# Patient Record
Sex: MALE | Race: WHITE | NOT HISPANIC OR LATINO | Employment: OTHER | URBAN - METROPOLITAN AREA
[De-identification: names, ages, dates, MRNs, and addresses within clinical notes are randomized per-mention and may not be internally consistent; named-entity substitution may affect disease eponyms.]

---

## 2018-06-01 ENCOUNTER — OFFICE VISIT (OUTPATIENT)
Dept: UROLOGY | Facility: CLINIC | Age: 63
End: 2018-06-01
Payer: COMMERCIAL

## 2018-06-01 VITALS
BODY MASS INDEX: 27.02 KG/M2 | HEART RATE: 68 BPM | HEIGHT: 67 IN | WEIGHT: 172.13 LBS | DIASTOLIC BLOOD PRESSURE: 83 MMHG | SYSTOLIC BLOOD PRESSURE: 147 MMHG

## 2018-06-01 DIAGNOSIS — N50.819 TESTICLE PAIN: Primary | ICD-10-CM

## 2018-06-01 DIAGNOSIS — N45.1 ACUTE EPIDIDYMITIS: ICD-10-CM

## 2018-06-01 DIAGNOSIS — N52.9 ERECTILE DYSFUNCTION, UNSPECIFIED ERECTILE DYSFUNCTION TYPE: ICD-10-CM

## 2018-06-01 LAB
BILIRUB SERPL-MCNC: NORMAL MG/DL
BLOOD URINE, POC: NORMAL
COLOR, POC UA: YELLOW
GLUCOSE UR QL STRIP: NORMAL
KETONES UR QL STRIP: NORMAL
LEUKOCYTE ESTERASE URINE, POC: NORMAL
NITRITE, POC UA: NORMAL
PH, POC UA: 6
PROTEIN, POC: NORMAL
SPECIFIC GRAVITY, POC UA: 1.02
UROBILINOGEN, POC UA: NORMAL

## 2018-06-01 PROCEDURE — 3079F DIAST BP 80-89 MM HG: CPT | Mod: CPTII,S$GLB,, | Performed by: UROLOGY

## 2018-06-01 PROCEDURE — 81002 URINALYSIS NONAUTO W/O SCOPE: CPT | Mod: S$GLB,,, | Performed by: UROLOGY

## 2018-06-01 PROCEDURE — 99999 PR PBB SHADOW E&M-EST. PATIENT-LVL III: CPT | Mod: PBBFAC,,, | Performed by: UROLOGY

## 2018-06-01 PROCEDURE — 3077F SYST BP >= 140 MM HG: CPT | Mod: CPTII,S$GLB,, | Performed by: UROLOGY

## 2018-06-01 PROCEDURE — 3008F BODY MASS INDEX DOCD: CPT | Mod: CPTII,S$GLB,, | Performed by: UROLOGY

## 2018-06-01 PROCEDURE — 99214 OFFICE O/P EST MOD 30 MIN: CPT | Mod: 25,S$GLB,, | Performed by: UROLOGY

## 2018-06-01 RX ORDER — SILDENAFIL CITRATE 20 MG/1
20 TABLET ORAL 3 TIMES DAILY
Qty: 50 TABLET | Refills: 11 | Status: SHIPPED | OUTPATIENT
Start: 2018-06-01 | End: 2019-07-31

## 2018-06-01 RX ORDER — DOXYCYCLINE 100 MG/1
100 CAPSULE ORAL 2 TIMES DAILY
Qty: 20 CAPSULE | Refills: 0 | Status: SHIPPED | OUTPATIENT
Start: 2018-06-01 | End: 2018-06-11

## 2018-06-01 NOTE — PROGRESS NOTES
UROLOGY [place]  6 1 18    c-c R testicular pain    Age 63, comes in that he has been having a discomfort on the R testicle, especially during driving, or while being in bed and turning if he would inadvertently compress the genital area between thighs. The area was not apparently swollen, only tender. The L side not giving him trouble.     Voiding generally well, with diminished flow. Nocturia x 2-3. No burning or pain during voiding.       PMH     Surgical: Back surgery; Penis surgery; Esophageal dilation; Circumcision, primary; Joint replacement; and Spine surgery.     Medical: Kidney stone; Allergy; Colon polyp; Hypertension; Osteoarthritis; Acid reflux; Umbilical hernia; DM (diabetes mellitus) (2012); Diabetes mellitus type II; and Hyperlipidemia.     Familial: no fh of prostate ca. Father and mother both  of lung cancer (both started smoking during childhood: he at age 8, she at age 13). Brother  of massive heart attack at age 34.     Social: , lives in [place], works for [employer]            Current Outpatient Prescriptions on File Prior to Visit   Medication Sig Dispense Refill    aspirin 81 MG Chew Take 81 mg by mouth         ezetimibe (ZETIA) 10 mg tablet Take 1 tablet  90 tablet 3    fish oil-omega-3 fatty acids 300-1,000 mg capsule Take 2 g by mouth once daily.        glimepiride (AMARYL) 2 MG tablet Take 1 tablet (2 mg 90 tablet 3    ibuprofen (ADVIL,MOTRIN) 800 MG tablet Take 1 tablet (800 mg total) b 90 tablet 6    metformin (GLUCOPHAGE) 500 MG tablet Take 2 tablets (1,000 mg total) by mo 360 tablet 3    omeprazole (PRILOSEC) 40 MG Take 1 capsule (40 m 90 capsule 3    ONETOUCH DELICA JUDD TEST TWICE DAILY 100 each 3    ONETOUCH ULTRA TEST Strp TEST TWICE DAILY 250 strip 0    saw-pygeum-nettle-pumpkn-aa#17 (PROSTAT Take 1 tablet by mouth once daily.         testosterone (AXIRON) 30 mg/actuation (1.5 mL) SlPm Apply 30 mg topically every other day. 90 mL 3     losartan-hydrochlorothiazi Take 1 tablet by mout 90 tablet 3      ROS:  Denies malaise, headaches, eye symptoms, difficulty swallowing or breathing problems.    No chest pains or palpitations.    No change in bowel habits, no tarry stools or hematochezia. Has acid reflux.  No genital lesions.  No bleeding disorders, no seizures.  Psych normal affect, normal mood     PE:  Pt alert, oriented, no distress  HEENT: wnl.  Neck: supple, no JVD, no lymphadenopathy  Chest: CV NSR  Lungs: normal chest expansion  Abdomen flat, nontender, no organomegaly, no masses.  No hernias  Penis circumcised, meatus nl  Testes nl, epi nl, scrotum nl  DONNIE: anus nl, sphincter nl tone, mucosa without lesions, prostate 30 gm, symmetric, no nodules or indurations  Extremities: no edema, peripheral pulses nl  Neuro: preserved      psa done one year ago was 0.56     IMPRESSION:     Acute epididymitis, will use doxycycline 100 bid x 2 weeks  ]Benign prostatic hypertrophy, on nutritional supplement     Erectile dysfct, on pde5 inhibitor (viagra 100 disp 8 per mo)

## 2018-06-14 ENCOUNTER — TELEPHONE (OUTPATIENT)
Dept: UROLOGY | Facility: CLINIC | Age: 63
End: 2018-06-14

## 2018-06-14 DIAGNOSIS — N45.1 EPIDIDYMITIS: Primary | ICD-10-CM

## 2018-06-14 RX ORDER — SULFAMETHOXAZOLE AND TRIMETHOPRIM 800; 160 MG/1; MG/1
1 TABLET ORAL 2 TIMES DAILY
Qty: 28 TABLET | Refills: 0 | Status: SHIPPED | OUTPATIENT
Start: 2018-06-14 | End: 2018-06-28

## 2018-06-14 NOTE — TELEPHONE ENCOUNTER
PLEASE LET PT KNOW THAT I CALLED HIS PHARMACY ([facility]) AND PRESCRIBED TWO MORE WEEKS OF AN ANTIBIOTIC ( A DIFFERENT ONE THIS TIME, BACTRIM), HOPING IT WILL HELP HIS TESTICLES. IF CONTINUES WITH PROBLEMS WILL NEED TO COME AND SEE ME AGAIN AND I MIGHT BE ORDERING IMAGING, ETC.

## 2018-06-14 NOTE — TELEPHONE ENCOUNTER
Spoke to pt and he is still having pain in his testicles. Pt takes OTC supplement for his prostate. Pt states he took the 2 weeks of doxycycline with no improvement. Please advise.

## 2018-06-14 NOTE — TELEPHONE ENCOUNTER
----- Message from Ras Pickett sent at 6/14/2018 10:53 AM CDT -----  Contact: Patient  Hima, [phone]. Calling in regards to his testicular pain. Says he finished the antibiotics but is still in pain. Please advise. Thanks.

## 2018-06-15 NOTE — TELEPHONE ENCOUNTER
Spoke to pt and informed him of the information below. Pt verbalized understanding. Booked pt for an apt in 2 weeks just in case, if pt does not need it, he will cancel.

## 2018-06-20 ENCOUNTER — TELEPHONE (OUTPATIENT)
Dept: ENDOCRINOLOGY | Facility: CLINIC | Age: 63
End: 2018-06-20

## 2018-06-20 NOTE — TELEPHONE ENCOUNTER
----- Message from Mariana Martinez sent at 6/20/2018  8:17 AM CDT -----  Contact: self  Patient call stating he received a letter saying he could schedule an earlier appointment than the one he has on 8/15    epic does not have anything earlier than 8/15    Please call [phone] (home)

## 2018-06-22 ENCOUNTER — TELEPHONE (OUTPATIENT)
Dept: UROLOGY | Facility: CLINIC | Age: 63
End: 2018-06-22

## 2018-06-22 NOTE — TELEPHONE ENCOUNTER
----- Message from Leonard Ramirez sent at 6/22/2018 10:40 AM CDT -----  Contact: same  Patient called in and stated he received an email regarding his test (urine) results being posted but would like a call back to go over them as patient has opted out of the  [facility] system.    Patient had test done on 6/1/18 at the [address] [facility] [address] location    Patient call back number is [phone]

## 2018-07-11 ENCOUNTER — OFFICE VISIT (OUTPATIENT)
Dept: UROLOGY | Facility: CLINIC | Age: 63
End: 2018-07-11
Payer: COMMERCIAL

## 2018-07-11 VITALS
SYSTOLIC BLOOD PRESSURE: 139 MMHG | HEIGHT: 67 IN | HEART RATE: 69 BPM | DIASTOLIC BLOOD PRESSURE: 82 MMHG | WEIGHT: 177.25 LBS | BODY MASS INDEX: 27.82 KG/M2

## 2018-07-11 DIAGNOSIS — N45.1 EPIDIDYMITIS: Primary | ICD-10-CM

## 2018-07-11 LAB
BILIRUB SERPL-MCNC: NORMAL MG/DL
BLOOD URINE, POC: NORMAL
COLOR, POC UA: YELLOW
GLUCOSE UR QL STRIP: 500
KETONES UR QL STRIP: NORMAL
LEUKOCYTE ESTERASE URINE, POC: NORMAL
NITRITE, POC UA: NORMAL
PH, POC UA: 6
PROTEIN, POC: NORMAL
SPECIFIC GRAVITY, POC UA: 1.02
UROBILINOGEN, POC UA: 0.2

## 2018-07-11 PROCEDURE — 3008F BODY MASS INDEX DOCD: CPT | Mod: CPTII,S$GLB,, | Performed by: UROLOGY

## 2018-07-11 PROCEDURE — 99999 PR PBB SHADOW E&M-EST. PATIENT-LVL III: CPT | Mod: PBBFAC,,, | Performed by: UROLOGY

## 2018-07-11 PROCEDURE — 3079F DIAST BP 80-89 MM HG: CPT | Mod: CPTII,S$GLB,, | Performed by: UROLOGY

## 2018-07-11 PROCEDURE — 81002 URINALYSIS NONAUTO W/O SCOPE: CPT | Mod: S$GLB,,, | Performed by: UROLOGY

## 2018-07-11 PROCEDURE — 3075F SYST BP GE 130 - 139MM HG: CPT | Mod: CPTII,S$GLB,, | Performed by: UROLOGY

## 2018-07-11 PROCEDURE — 99213 OFFICE O/P EST LOW 20 MIN: CPT | Mod: 25,S$GLB,, | Performed by: UROLOGY

## 2018-07-11 RX ORDER — CIPROFLOXACIN 500 MG/1
TABLET ORAL
Refills: 1 | COMMUNITY
Start: 2018-06-04 | End: 2019-02-14

## 2018-07-11 NOTE — PROGRESS NOTES
UROLOGY [place]  7 11 18    c-c testicular pain    Age 63, was here 6 weeks ago for a pain, the pain occurs over the R testicle and not anywhere else.  Pt was here recently for this and received doxycycline for it.  On exam the area is improved and has minimal if any tenderness.  Pt agrees that he has improved.      psa done one year ago was 0.56     IMPRESSION:      Acute epididymitis, used doxycycline 100 bid x 2 weeks  Pt believes he would benefit with another two weeks of antibiotic.  There is no evidence that he has continued infection but for peace of mind I am putting him on bactim x 2 weeks.  Benign prostatic hypertrophy, on nutritional supplement     Erectile dysfct, on pde5 inhibitor (viagra 100 disp 8 per mo)  Hypogonadism, on testosterone replacement.

## 2018-08-15 ENCOUNTER — OFFICE VISIT (OUTPATIENT)
Dept: ENDOCRINOLOGY | Facility: CLINIC | Age: 63
End: 2018-08-15
Payer: COMMERCIAL

## 2018-08-15 VITALS
WEIGHT: 175.38 LBS | HEIGHT: 67 IN | SYSTOLIC BLOOD PRESSURE: 138 MMHG | HEART RATE: 84 BPM | RESPIRATION RATE: 18 BRPM | DIASTOLIC BLOOD PRESSURE: 74 MMHG | BODY MASS INDEX: 27.53 KG/M2

## 2018-08-15 DIAGNOSIS — E11.649 TYPE 2 DIABETES MELLITUS WITH HYPOGLYCEMIA WITHOUT COMA, WITHOUT LONG-TERM CURRENT USE OF INSULIN: ICD-10-CM

## 2018-08-15 DIAGNOSIS — R53.83 FATIGUE, UNSPECIFIED TYPE: ICD-10-CM

## 2018-08-15 DIAGNOSIS — E29.1 MALE HYPOGONADISM: Primary | ICD-10-CM

## 2018-08-15 PROCEDURE — 3075F SYST BP GE 130 - 139MM HG: CPT | Mod: CPTII,S$GLB,, | Performed by: INTERNAL MEDICINE

## 2018-08-15 PROCEDURE — 99999 PR PBB SHADOW E&M-EST. PATIENT-LVL V: CPT | Mod: PBBFAC,,, | Performed by: INTERNAL MEDICINE

## 2018-08-15 PROCEDURE — 99204 OFFICE O/P NEW MOD 45 MIN: CPT | Mod: S$GLB,,, | Performed by: INTERNAL MEDICINE

## 2018-08-15 PROCEDURE — 3008F BODY MASS INDEX DOCD: CPT | Mod: CPTII,S$GLB,, | Performed by: INTERNAL MEDICINE

## 2018-08-15 PROCEDURE — 3045F PR MOST RECENT HEMOGLOBIN A1C LEVEL 7.0-9.0%: CPT | Mod: CPTII,S$GLB,, | Performed by: INTERNAL MEDICINE

## 2018-08-15 PROCEDURE — 3078F DIAST BP <80 MM HG: CPT | Mod: CPTII,S$GLB,, | Performed by: INTERNAL MEDICINE

## 2018-08-15 RX ORDER — PIOGLITAZONEHYDROCHLORIDE 15 MG/1
15 TABLET ORAL DAILY
Qty: 90 TABLET | Refills: 3 | Status: SHIPPED | OUTPATIENT
Start: 2018-08-15 | End: 2018-08-22

## 2018-08-15 NOTE — PROGRESS NOTES
CHIEF COMPLAINT: Hypogonadism/DM 2  63 year old being seen as a new patient. On testosterone 100 mg Q 14 days. Has been on tx for 3-4 years. States has been fatigued. He does get up to urinate. He does have an enlarged prostate. He does snore at night. No ED and normal libido.     States that BG are increased in the past few weeks. States yesterday had BG in 70's. On amaryl 2 mg BID and metformin. States could not take statins due to elevated LFT. Due for eye exam in Sept. Eats 3 meals a day.       PAST MEDICAL HISTORY/PAST SURGICAL HISTORY:  Reviewed in [facility]    SOCIAL HISTORY: No T/A. 1 son    FAMILY HISTORY:  No known thyroid disease. No DM.     MEDICATIONS/ALLERGIES: The patient's MedCard has been updated and reviewed.      ROS:   Constitutional: No recent significant weight change  Eyes: No recent visual changes  ENT: No dysphagia  Cardiovascular: Denies current anginal symptoms  Respiratory: Denies current respiratory difficulty  Gastrointestinal: Denies recent bowel disturbances  GenitoUrinary - No hematuria  Skin: No new skin rash  Neurologic: No focal neurologic complaints  Remainder ROS negative        PE:    GENERAL: Well developed, well nourished.  PSYCH:  appropriate mood and affect  EYES:  PERRL, EOM intact.  ENT: Nares patent, oropharynx clear, mucosa pink,   NECK: Supple, trachea midline, No palpable thyroid nodules.   CHEST: Resp even and unlabored, CTA bilateral.  CARDIAC: RRR, S1, S2 heard, no murmurs, rubs, S3, or S4  ABDOMEN: Soft, non-tender, non-distended;  No organomegaly  VASCULAR:  DP pulses +2/4 bilaterally, no edema  NEURO: Gait steady, CN II-VII grossly intact  SKIN: No areas of breakdown, no acanthosis nigracans.    LABS   Results for SHAHNAZ GARRISON (MRN [id number]) as of 8/15/2018 08:07   Ref. Range 2/23/2018 08:38   Hemoglobin A1C Latest Ref Range: 0.0 - 5.6 % 7.0 (H)   Estimated Avg Glucose Latest Ref Range: 68 - 131 mg/dL 154 (H)   Testosterone Latest Units: ng/dL 391.0        ASSESSMENT/PLAN:  1. Male Hypogonadism- Does not appear that he had a workup when initially diagnosed. Discussed that to do a complete w/u would have to stop testosterone. For now will do Prolactin. Discussed risks of testosterone use.     2. Fatigue- Check CBC as had been slightly anemic in the past. Discussed getting sleep study. May need to see urology as he is urinating more and has an enlarged prostate. Will check A1c 1st to make sure not having nocturia due to DM    3. DM 2- with hypoglycemia. Stop amaryl and place on actos. Should not have hypoglycemia. At that point can see if night time sweating resolves as I am unsure if it is related to a hypoglycemic event. Due for eye exam in Nov.       FOLLOWUP  Sat- CMP, A1c, testosterone. Prolactin, CBC, urine micro  Sleep Study

## 2018-08-21 ENCOUNTER — TELEPHONE (OUTPATIENT)
Dept: ENDOCRINOLOGY | Facility: CLINIC | Age: 63
End: 2018-08-21

## 2018-08-21 NOTE — TELEPHONE ENCOUNTER
Pt is concerned about his BG readings.  States they have been in the lower 200's 2 hrs or more after meals.  Around 119 fasting.  On oral DM meds only.  Will drop off BG log in the morning for Dr. Minor to review.

## 2018-08-21 NOTE — TELEPHONE ENCOUNTER
----- Message from Shelly Simpson sent at 8/21/2018 11:47 AM CDT -----  Contact: Patient  Patient would like to let the doctor know that his sugar levels have spiked in the last few weeks.  Call Back#[phone]  Thanks

## 2018-08-22 RX ORDER — PIOGLITAZONEHYDROCHLORIDE 15 MG/1
45 TABLET ORAL DAILY
Qty: 30 TABLET | Refills: 3 | Status: SHIPPED | OUTPATIENT
Start: 2018-08-22 | End: 2018-08-22 | Stop reason: SDUPTHER

## 2018-08-22 RX ORDER — PIOGLITAZONEHYDROCHLORIDE 15 MG/1
45 TABLET ORAL DAILY
Qty: 90 TABLET | Refills: 3 | Status: SHIPPED | OUTPATIENT
Start: 2018-08-22 | End: 2018-09-18 | Stop reason: SDUPTHER

## 2018-08-22 NOTE — TELEPHONE ENCOUNTER
Pt advised and verbalized understanding.  He is using [facility] not [facility] so will resend to [facility].

## 2018-08-22 NOTE — TELEPHONE ENCOUNTER
Let him know I reviewed his logs. A1c shows good control but BG are higher than expected. Increase actos to 45 mg once daily. I will send Rx in.     Send logs 2-3 weeks

## 2018-09-03 ENCOUNTER — TELEPHONE (OUTPATIENT)
Dept: ENDOCRINOLOGY | Facility: CLINIC | Age: 63
End: 2018-09-03

## 2018-09-03 NOTE — TELEPHONE ENCOUNTER
Let him know A1c is < 7.   He is slightly anemic. Will need to f/u with PCP to see if any further w/u needed

## 2018-09-11 ENCOUNTER — TELEPHONE (OUTPATIENT)
Dept: ENDOCRINOLOGY | Facility: CLINIC | Age: 63
End: 2018-09-11

## 2018-09-11 NOTE — TELEPHONE ENCOUNTER
Let him know that I reviewed his logs. Have him statr checking ACH, HS readings and not after meals.   Would like to give actvictor m more time. Can you see what dose of Actos he is on. There are 2 doses listed in his records. If on 45 mg will need to send 45 mg tablet

## 2018-09-18 RX ORDER — PIOGLITAZONEHYDROCHLORIDE 45 MG/1
45 TABLET ORAL DAILY
Qty: 90 TABLET | Refills: 3 | Status: SHIPPED | OUTPATIENT
Start: 2018-09-18 | End: 2019-09-21 | Stop reason: SDUPTHER

## 2018-09-18 NOTE — TELEPHONE ENCOUNTER
----- Message from Mariana Martinez sent at 9/18/2018  8:21 AM CDT -----  Contact: self  Type:  RX Refill Request    Who Called:  Patient   Refill or New Rx:refill  RX Name and Strength:  pioglitazone (ACTOS) 15 MG tablet (patient states it should be 45 mg)  How is the patient currently taking it? (ex. 1XDay):    Is this a 30 day or 90 day RX:     Preferred Pharmacy with phone number:    [facility] [address] [place] [address] AT [facility] [address] [address]  Phone: [phone] Fax: [phone]        Local or Mail Order:  local  Ordering Provider:  Iván  Best Call Back Number:[phone] (home)     Additional Information:  Patient states DR. Minor up miligrams to 45mg per patient   Patient states he only has enough to tomorrow morning ,then patient states he will be out

## 2018-11-02 ENCOUNTER — TELEPHONE (OUTPATIENT)
Dept: ENDOCRINOLOGY | Facility: CLINIC | Age: 63
End: 2018-11-02

## 2018-11-02 NOTE — TELEPHONE ENCOUNTER
Let him know I reviewed his glucose logs with AC/HS readings.   Can stay on the same doses for now.

## 2018-11-06 ENCOUNTER — TELEPHONE (OUTPATIENT)
Dept: ENDOCRINOLOGY | Facility: CLINIC | Age: 63
End: 2018-11-06

## 2018-11-06 NOTE — TELEPHONE ENCOUNTER
----- Message from Flora Blas sent at 11/6/2018  9:35 AM CST -----  Contact: wife, Antoinette Mensah  Type:  RX Refill Request    Who Called:  self  Refill or New Rx:  new  RX Name and Strength:  testosterone cypionate (DEPOTESTOTERONE CYPIONATE) 100 mg/mL injection  How is the patient currently taking it? (ex. 1XDay):  Every 14 days  Is this a 30 day or 90 day RX:  30  Preferred Pharmacy with phone number:  [facility]  Local or Mail Order:  local  Ordering Provider:  Dr Prince Alexander  Best Call Back Number:  [phone]  Additional Information:  Patient's wife states patient's primary doctor told him to get the medication from his endocrinologist. Please call wife if any questions. Thanks!

## 2018-11-06 NOTE — TELEPHONE ENCOUNTER
Pt's wife states his PCP now wants endocrine to manage his testosterone.  Requesting refill.  Please advise.  She is aware you are out of the office until Monday 11/12/18.

## 2018-11-07 ENCOUNTER — TELEPHONE (OUTPATIENT)
Dept: GASTROENTEROLOGY | Facility: CLINIC | Age: 63
End: 2018-11-07

## 2018-11-07 NOTE — TELEPHONE ENCOUNTER
----- Message from Izabel Hernandez LPN sent at 11/6/2018 11:05 AM CST -----  Contact: wife, Antoinette Mensah      ----- Message -----  From: Flora Blas  Sent: 11/6/2018   9:33 AM  To: Asa CASTREJON Staff    Patient needs to schedule a colonoscopy. Patient's last colonoscopy was done around 11/05/10 with Dr Bray. Please call patient's wife, Antoinette Mensah at [phone]. Thanks!

## 2018-11-07 NOTE — TELEPHONE ENCOUNTER
----- Message from Angelika Wren sent at 11/7/2018 12:11 PM CST -----  Contact: Self  Type:  Patient Returning Call    Who Called:  Patient   Who Left Message for Patient:  Abi   Does the patient know what this is regarding?:  No   Best Call Back Number:  [phone] (home)   Additional Information:

## 2018-11-07 NOTE — TELEPHONE ENCOUNTER
----- Message from Angelika Wren sent at 11/7/2018 12:11 PM CST -----  Contact: Self  Type:  Patient Returning Call    Who Called:  Patient   Who Left Message for Patient:  Abi   Does the patient know what this is regarding?:  No   Best Call Back Number:  [phone] (home)   Additional Information:

## 2018-11-14 RX ORDER — TESTOSTERONE CYPIONATE 1000 MG/10ML
100 INJECTION, SOLUTION INTRAMUSCULAR
Qty: 10 ML | Refills: 3 | Status: SHIPPED | OUTPATIENT
Start: 2018-11-14 | End: 2018-11-14 | Stop reason: SDUPTHER

## 2018-11-15 ENCOUNTER — TELEPHONE (OUTPATIENT)
Dept: ENDOCRINOLOGY | Facility: CLINIC | Age: 63
End: 2018-11-15

## 2018-11-15 NOTE — TELEPHONE ENCOUNTER
----- Message from Flora Blas sent at 11/15/2018  1:08 PM CST -----  Type:  Patient Returning Call    Who Called:  self  Who Left Message for Patient:  Jose  Does the patient know what this is regarding?:  yes  Best Call Back Number:  [phone]   Additional Information:  Patient states he had Dr Alexander call in the testosterone. Please call patient if this is the medication she is calling in. Thanks!

## 2019-06-26 PROBLEM — R19.4 CHANGE IN BOWEL HABITS: Status: ACTIVE | Noted: 2019-06-26

## 2019-07-31 ENCOUNTER — OFFICE VISIT (OUTPATIENT)
Dept: UROLOGY | Facility: CLINIC | Age: 64
End: 2019-07-31
Payer: COMMERCIAL

## 2019-07-31 VITALS
WEIGHT: 179.69 LBS | DIASTOLIC BLOOD PRESSURE: 88 MMHG | SYSTOLIC BLOOD PRESSURE: 175 MMHG | BODY MASS INDEX: 28.2 KG/M2 | HEIGHT: 67 IN | HEART RATE: 85 BPM

## 2019-07-31 DIAGNOSIS — R31.29 MICROSCOPIC HEMATURIA: ICD-10-CM

## 2019-07-31 DIAGNOSIS — R31.29 OTHER MICROSCOPIC HEMATURIA: Primary | ICD-10-CM

## 2019-07-31 DIAGNOSIS — N45.1 EPIDIDYMITIS: Primary | ICD-10-CM

## 2019-07-31 LAB
BILIRUB SERPL-MCNC: NORMAL MG/DL
BLOOD URINE, POC: NORMAL
COLOR, POC UA: YELLOW
GLUCOSE UR QL STRIP: 100
KETONES UR QL STRIP: NORMAL
LEUKOCYTE ESTERASE URINE, POC: NORMAL
NITRITE, POC UA: NORMAL
PH, POC UA: 5.5
PROTEIN, POC: NORMAL
SPECIFIC GRAVITY, POC UA: 1.02
UROBILINOGEN, POC UA: NORMAL

## 2019-07-31 PROCEDURE — 3008F BODY MASS INDEX DOCD: CPT | Mod: CPTII,S$GLB,, | Performed by: UROLOGY

## 2019-07-31 PROCEDURE — 99214 OFFICE O/P EST MOD 30 MIN: CPT | Mod: 25,S$GLB,, | Performed by: UROLOGY

## 2019-07-31 PROCEDURE — 81002 POCT URINE DIPSTICK WITHOUT MICROSCOPE: ICD-10-PCS | Mod: S$GLB,,, | Performed by: UROLOGY

## 2019-07-31 PROCEDURE — 99999 PR PBB SHADOW E&M-EST. PATIENT-LVL III: CPT | Mod: PBBFAC,,, | Performed by: UROLOGY

## 2019-07-31 PROCEDURE — 3079F PR MOST RECENT DIASTOLIC BLOOD PRESSURE 80-89 MM HG: ICD-10-PCS | Mod: CPTII,S$GLB,, | Performed by: UROLOGY

## 2019-07-31 PROCEDURE — 81002 URINALYSIS NONAUTO W/O SCOPE: CPT | Mod: S$GLB,,, | Performed by: UROLOGY

## 2019-07-31 PROCEDURE — 3077F PR MOST RECENT SYSTOLIC BLOOD PRESSURE >= 140 MM HG: ICD-10-PCS | Mod: CPTII,S$GLB,, | Performed by: UROLOGY

## 2019-07-31 PROCEDURE — 3008F PR BODY MASS INDEX (BMI) DOCUMENTED: ICD-10-PCS | Mod: CPTII,S$GLB,, | Performed by: UROLOGY

## 2019-07-31 PROCEDURE — 3077F SYST BP >= 140 MM HG: CPT | Mod: CPTII,S$GLB,, | Performed by: UROLOGY

## 2019-07-31 PROCEDURE — 99214 PR OFFICE/OUTPT VISIT, EST, LEVL IV, 30-39 MIN: ICD-10-PCS | Mod: 25,S$GLB,, | Performed by: UROLOGY

## 2019-07-31 PROCEDURE — 99999 PR PBB SHADOW E&M-EST. PATIENT-LVL III: ICD-10-PCS | Mod: PBBFAC,,, | Performed by: UROLOGY

## 2019-07-31 PROCEDURE — 3079F DIAST BP 80-89 MM HG: CPT | Mod: CPTII,S$GLB,, | Performed by: UROLOGY

## 2019-07-31 NOTE — PROGRESS NOTES
UROLOGY [place]  7 31 19     c-c testicular pain     Age 64, has the testicular pain that he had the last time he was here, from time to time. It is always on the R testicle, never on the L side. The pain is dull, like a pressure, it happens if pt is driving, but if pt repositions himself the pain may get better. No nausea, vomiting, fever, flank pain.     Would also like to get his prescription for generic viagra, uses it to improve the quality of erections. says the erection is still not good enough. Pt also used levitra and cialis.     In the past dr mere ordaz did intracavernosal injection on the pt and the results were not very good, as the thought of the needle caused pt to be hypotensive. Pt says the injection (done once) caused him to have a good initial reaction but did not last long.     Pt also has a vacuum device and he has not succeeded in getting a good lasting erection with the vacuum device. Pt is disappointed of the vacuum device as well.      Voiding generally well, with diminished flow. Nocturia x 2-3. No burning or pain during voiding.       PMH     Surgical: Back surgery; Penis surgery; Esophageal dilation; Circumcision, primary; Joint replacement; and Spine surgery.     Medical: Kidney stone; Allergy; Colon polyp; Hypertension; Osteoarthritis; Acid reflux; Umbilical hernia; DM (diabetes mellitus) (2012); Diabetes mellitus type II; and Hyperlipidemia.     Familial: no fh of prostate ca. Father and mother both  of lung cancer (both started smoking during childhood: he at age 8, she at age 13). Brother  of massive heart attack at age 34.     Social: , lives in [place], works for [employer]                 Current Outpatient Prescriptions on File Prior to Visit   Medication Sig Dispense Refill    aspirin 81 MG Chew Take 81 mg by mouth         ezetimibe (ZETIA) 10 mg tablet Take 1 tablet  90 tablet 3    fish oil-omega-3 fatty acids 300-1,000 mg capsule Take 2 g by mouth once  daily.        glimepiride (AMARYL) 2 MG tablet Take 1 tablet (2 mg 90 tablet 3    ibuprofen (ADVIL,MOTRIN) 800 MG tablet Take 1 tablet (800 mg total) b 90 tablet 6    metformin (GLUCOPHAGE) 500 MG tablet Take 2 tablets (1,000 mg total) by mo 360 tablet 3    omeprazole (PRILOSEC) 40 MG Take 1 capsule (40 m 90 capsule 3    ONETOUCH DELICA JUDD TEST TWICE DAILY 100 each 3    ONETOUCH ULTRA TEST Strp TEST TWICE DAILY 250 strip 0    saw-pygeum-nettle-pumpkn-aa#17 (PROSTAT Take 1 tablet by mouth once daily.         testosterone (AXIRON) 30 mg/actuation (1.5 mL) SlPm Apply 30 mg topically every other day. 90 mL 3    losartan-hydrochlorothiazi Take 1 tablet by mout 90 tablet 3      ROS:  Denies malaise, headaches, eye symptoms, difficulty swallowing or breathing problems.    No chest pains or palpitations.    No change in bowel habits, no tarry stools or hematochezia. Has acid reflux.  No genital lesions.  No bleeding disorders, no seizures.  Psych normal affect, normal mood     PE:  Pt alert, oriented, no distress  HEENT: wnl.  Neck: supple, no JVD, no lymphadenopathy  Chest: CV NSR  Lungs: normal chest expansion  Abdomen flat, nontender, no organomegaly, no masses.  No hernias  Penis circumcised, meatus nl  Testes nontender, epi nl, scrotum nl  DONNIE: anus nl, sphincter nl tone, mucosa without lesions, prostate 40 gm, symmetric, no nodules or indurations  Extremities: no edema, peripheral pulses nl  Neuro: preserved      psa 0.65 feb 2019     IMPRESSION:     Bph, on observation   hx of acute epididymitis, currently with hypersensive epididymis, reassured,  No antibiotic necessary  Erectile dysfct, on pde5 inhibitor (viagra 100 disp 8 per mo)  Pt says that if he fails again intracavernosal injections and he failed the vacuum device, he might consider placement of inflatable penile prosthesis  There was unexpected microhematuria today. Deliver urinalysis in one month, and if there is microhematuria still, will proceed  with cystoscopy, urine cytology and upper tract imaging.

## 2019-08-26 DIAGNOSIS — N52.9 ERECTILE DYSFUNCTION, UNSPECIFIED ERECTILE DYSFUNCTION TYPE: Primary | ICD-10-CM

## 2019-08-26 RX ORDER — SILDENAFIL CITRATE 20 MG/1
20 TABLET ORAL 3 TIMES DAILY
Qty: 80 TABLET | Refills: 11 | Status: SHIPPED | OUTPATIENT
Start: 2019-08-26 | End: 2021-01-06

## 2019-08-26 NOTE — TELEPHONE ENCOUNTER
----- Message from Brian Goldberg sent at 8/26/2019  8:14 AM CDT -----  Type: Needs Medical Advice    Who Called:  Self   Symptoms (please be specific):  How long has patient had these symptoms:    Pharmacy name and phone #:    Best Call Back Number: [phone]  Additional Information: Patient states the recent prescribed rx is not working. Patient states the previous rx prescribed by the doctor work better. Patient asking to discuss with the nurse.

## 2019-08-26 NOTE — TELEPHONE ENCOUNTER
----- Message from Liliana Simmons sent at 8/26/2019  4:39 PM CDT -----  Contact: Charlene leija/ [facility]  Type:  Pharmacy Calling to Clarify an RX    Name of Caller:  Charlene  Pharmacy Name:  [facility]  Prescription Name:  sildenafil (REVATIO) 20 mg Tab  What do they need to clarify?:  Directions  Best Call Back Number:  [phone]  Additional Information: please advise--thank you

## 2019-09-10 ENCOUNTER — LAB VISIT (OUTPATIENT)
Dept: LAB | Facility: HOSPITAL | Age: 64
End: 2019-09-10
Attending: UROLOGY
Payer: COMMERCIAL

## 2019-09-10 DIAGNOSIS — R31.29 OTHER MICROSCOPIC HEMATURIA: ICD-10-CM

## 2019-09-10 LAB
BACTERIA #/AREA URNS HPF: ABNORMAL /HPF
BILIRUB UR QL STRIP: NEGATIVE
CLARITY UR: CLEAR
COLOR UR: YELLOW
GLUCOSE UR QL STRIP: ABNORMAL
HGB UR QL STRIP: ABNORMAL
HYALINE CASTS #/AREA URNS LPF: 2 /LPF
KETONES UR QL STRIP: NEGATIVE
LEUKOCYTE ESTERASE UR QL STRIP: NEGATIVE
MICROSCOPIC COMMENT: ABNORMAL
NITRITE UR QL STRIP: NEGATIVE
PH UR STRIP: 6 [PH] (ref 5–8)
PROT UR QL STRIP: NEGATIVE
RBC #/AREA URNS HPF: 15 /HPF (ref 0–4)
SP GR UR STRIP: >=1.03 (ref 1–1.03)
URN SPEC COLLECT METH UR: ABNORMAL
WBC #/AREA URNS HPF: 1 /HPF (ref 0–5)

## 2019-09-10 PROCEDURE — 81000 URINALYSIS NONAUTO W/SCOPE: CPT | Mod: PO

## 2019-09-23 RX ORDER — PIOGLITAZONEHYDROCHLORIDE 45 MG/1
TABLET ORAL
Qty: 90 TABLET | Refills: 1 | Status: SHIPPED | OUTPATIENT
Start: 2019-09-23 | End: 2020-02-26 | Stop reason: SDUPTHER

## 2019-10-02 ENCOUNTER — TELEPHONE (OUTPATIENT)
Dept: UROLOGY | Facility: CLINIC | Age: 64
End: 2019-10-02

## 2019-10-02 DIAGNOSIS — R31.29 MICROSCOPIC HEMATURIA: Primary | ICD-10-CM

## 2019-10-02 NOTE — TELEPHONE ENCOUNTER
----- Message from Leonard Ramirez sent at 10/2/2019  9:32 AM CDT -----  Contact: same  Type:  Test Results    Who Called:  patient  Name of Test (Lab/Mammo/Etc):  Urine culture  Date of Test:  9/10/19  Ordering Provider:  José Luis  Where the test was performed:  [address] [facility] [address]  Best Call Back Number:  [phone]  Additional Information:  n/a

## 2019-10-02 NOTE — TELEPHONE ENCOUNTER
Spoke to pt and he is still having micro hematuira. Pt was scheduled a kidney ultrasound, urine cytology, and cystoscopy

## 2019-10-11 ENCOUNTER — HOSPITAL ENCOUNTER (OUTPATIENT)
Dept: RADIOLOGY | Facility: HOSPITAL | Age: 64
Discharge: HOME | End: 2019-10-11
Attending: UROLOGY
Payer: COMMERCIAL

## 2019-10-11 DIAGNOSIS — R31.29 MICROSCOPIC HEMATURIA: ICD-10-CM

## 2019-10-11 PROCEDURE — 76770 US EXAM ABDO BACK WALL COMP: CPT | Mod: 26,,, | Performed by: RADIOLOGY

## 2019-10-11 PROCEDURE — 76770 US EXAM ABDO BACK WALL COMP: CPT | Mod: TC,PO

## 2019-10-11 PROCEDURE — 76770 US KIDNEY: ICD-10-PCS | Mod: 26,,, | Performed by: RADIOLOGY

## 2019-10-14 ENCOUNTER — TELEPHONE (OUTPATIENT)
Dept: UROLOGY | Facility: CLINIC | Age: 64
End: 2019-10-14

## 2019-10-14 NOTE — TELEPHONE ENCOUNTER
----- Message from Jaida Milan sent at 10/14/2019 10:03 AM CDT -----  Contact: patient  Type:  Test Results    Who Called:  patient  Name of Test (Lab/Mammo/Etc):  ultrasound  Date of Test:  10/11/2019  Ordering Provider:  José Luis  Where the test was performed:  [facility]  Best Call Back Number:  [phone]  Additional Information:

## 2019-11-06 ENCOUNTER — PROCEDURE VISIT (OUTPATIENT)
Dept: UROLOGY | Facility: CLINIC | Age: 64
End: 2019-11-06
Payer: COMMERCIAL

## 2019-11-06 VITALS
SYSTOLIC BLOOD PRESSURE: 159 MMHG | DIASTOLIC BLOOD PRESSURE: 83 MMHG | WEIGHT: 178.81 LBS | HEART RATE: 72 BPM | BODY MASS INDEX: 28.07 KG/M2 | HEIGHT: 67 IN

## 2019-11-06 DIAGNOSIS — D49.4 BLADDER TUMOR: ICD-10-CM

## 2019-11-06 DIAGNOSIS — R31.29 MICROSCOPIC HEMATURIA: ICD-10-CM

## 2019-11-06 DIAGNOSIS — N40.1 BENIGN PROSTATIC HYPERPLASIA WITH URINARY OBSTRUCTION: Primary | ICD-10-CM

## 2019-11-06 DIAGNOSIS — R39.12 WEAK URINARY STREAM: ICD-10-CM

## 2019-11-06 DIAGNOSIS — R33.9 INCOMPLETE BLADDER EMPTYING: ICD-10-CM

## 2019-11-06 DIAGNOSIS — C67.9 UROTHELIAL CARCINOMA OF BLADDER: ICD-10-CM

## 2019-11-06 DIAGNOSIS — N13.8 BENIGN PROSTATIC HYPERPLASIA WITH URINARY OBSTRUCTION: Primary | ICD-10-CM

## 2019-11-06 LAB
BILIRUB SERPL-MCNC: NORMAL MG/DL
BLOOD URINE, POC: NORMAL
COLOR, POC UA: NORMAL
GLUCOSE UR QL STRIP: 500
KETONES UR QL STRIP: NORMAL
LEUKOCYTE ESTERASE URINE, POC: NORMAL
NITRITE, POC UA: NORMAL
PH, POC UA: NORMAL
POC RESIDUAL URINE VOLUME: 1 ML (ref 0–100)
PROTEIN, POC: NORMAL
SPECIFIC GRAVITY, POC UA: 5.5
UROBILINOGEN, POC UA: 0.2

## 2019-11-06 PROCEDURE — 81002 POCT URINE DIPSTICK WITHOUT MICROSCOPE: ICD-10-PCS | Mod: S$GLB,,, | Performed by: UROLOGY

## 2019-11-06 PROCEDURE — 88112 CYTOPATH CELL ENHANCE TECH: CPT | Performed by: PATHOLOGY

## 2019-11-06 PROCEDURE — 52000 CYSTOURETHROSCOPY: CPT | Mod: S$GLB,,, | Performed by: UROLOGY

## 2019-11-06 PROCEDURE — 52000 PR CYSTOURETHROSCOPY: ICD-10-PCS | Mod: S$GLB,,, | Performed by: UROLOGY

## 2019-11-06 PROCEDURE — 51798 US URINE CAPACITY MEASURE: CPT | Mod: S$GLB,,, | Performed by: UROLOGY

## 2019-11-06 PROCEDURE — 88112 CYTOLOGY SPECIMEN-URINE: ICD-10-PCS | Mod: 26,,, | Performed by: PATHOLOGY

## 2019-11-06 PROCEDURE — 51798 PR MEAS,POST-VOID RES,US,NON-IMAGING: ICD-10-PCS | Mod: S$GLB,,, | Performed by: UROLOGY

## 2019-11-06 PROCEDURE — 81002 URINALYSIS NONAUTO W/O SCOPE: CPT | Mod: S$GLB,,, | Performed by: UROLOGY

## 2019-11-06 PROCEDURE — 88112 CYTOPATH CELL ENHANCE TECH: CPT | Mod: 26,,, | Performed by: PATHOLOGY

## 2019-11-06 RX ORDER — TAMSULOSIN HYDROCHLORIDE 0.4 MG/1
0.4 CAPSULE ORAL DAILY
Qty: 30 CAPSULE | Refills: 11 | Status: SHIPPED | OUTPATIENT
Start: 2019-11-06 | End: 2020-02-26 | Stop reason: SDUPTHER

## 2019-11-06 NOTE — PROCEDURES
Procedures   UROLOGY [place]  11 6 19     Cc microhematuria     Age 64, accompanied by wife. Pt is here for urologic w/u for microscopic hematuria. He also points out that his voiding stream is weak, and it is difficult for him to keep the stream going, and has to make some effort to have more than just a dribble.      psa 0.65 feb 2019    CYSTOSCOPY olympus flexible. Anterior urethra normal. Posterior urethra 4 cm, with bilobar hypertrophy and high posterior edge of bladder neck. Bladder cavity distends equally and symmetrically when filled with water. An 8 mm mucosal papillary lesion was found in the posterior wall towards the R side, low near the trigone. There were no other mucosal lesions found. There is no abnormal trabeculation. No diverticula are present. We then decided to proceed with cauterization of the papillary lesion. We used the bugbee electrode and cauterized the lesion extensively until it was completely obliterated. Pt tolerated the procedure well.    BLADDERSCAN  1   Ml residual urine    URINE CYTOLOGY pending    EXAMINATION: US KIDNEY  CLINICAL HISTORY: Other microscopic hematuria  TECHNIQUE: Ultrasound of the kidneys was performed including color flow and Doppler   evaluation of the kidneys.  COMPARISON: CT lumbar spine dated 06/08/2018  FINDINGS: Right kidney: The right kidney is normal in size measuring 10.7 x 6.3 x   6.6 cm.  There is no hydronephrosis.  There is no cystic or solid mass.    There are 2 nonobstructing calculi.  There is a 5 x 7 x 4 mm calculus in   the mid to upper pole medially.  There is a 5 x 4 x 5 mm calculus in the   midpole.  The resistive index is mildly elevated at 0.74.  There is no   significant parenchymal cortical thinning in the corticomedullary   distinction is preserved.  These calculi were incompletely included on   comparison CT lumbar spine.  Left kidney: The left kidney measures 11.4 x 6.4 x 6.0 cm and is normal in   size.  There is no hydronephrosis.   There is no solid or cystic mass.    There is a large 1.6 x 0.9 x 0.7 cm nonobstructing calculus in the lower   pole.  This was present on comparison CT.  The resistive index is elevated   at 0.77.  There is no significant parenchymal cortical thinning.  The   corticomedullary distinction is preserved.  The splenic artery resistive index is elevated at 0.72.  1. There is no hydronephrosis.  There are bilateral nonobstructing renal   calculi which were also present on comparison lumbar spine CT dated   06/08/2018.  2. There is no solid or cystic renal mass.  3. The resistive index is elevated in both kidneys suggesting intrinsic   renal disease.       IMPRESSION:     Small bladder tumor, 8 mm, posterior wall, cauterized completely. 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     I will repeat the cystoscopy in 3 months, if all ok then in 6 mo, and later in 1 year. I dont expect to need yearly cystos forever for this oncologically mild lesion.     Bph, with bladder outlet obstruction, with hesitancy, slow stream, and intermittency. I am starting pt on flomax to take one daily.     Nephrolithiasis (small size in calyceal positions), observation     hx of acute epididymitis, has remained with hypersensive epididymis, reassured in the past; subject did not come up on current visit.     Erectile dysfct, on pde5 inhibitors prn, subject did not come up in today's visit.    RTC 3 mo for cysto

## 2020-02-12 ENCOUNTER — PROCEDURE VISIT (OUTPATIENT)
Dept: UROLOGY | Facility: CLINIC | Age: 65
End: 2020-02-12
Payer: COMMERCIAL

## 2020-02-12 VITALS
DIASTOLIC BLOOD PRESSURE: 75 MMHG | HEART RATE: 75 BPM | WEIGHT: 178.38 LBS | HEIGHT: 67 IN | BODY MASS INDEX: 28 KG/M2 | SYSTOLIC BLOOD PRESSURE: 144 MMHG

## 2020-02-12 DIAGNOSIS — D49.4 BLADDER TUMOR: ICD-10-CM

## 2020-02-12 DIAGNOSIS — R39.12 BENIGN PROSTATIC HYPERPLASIA WITH WEAK URINARY STREAM: Primary | ICD-10-CM

## 2020-02-12 DIAGNOSIS — R31.29 MICROSCOPIC HEMATURIA: ICD-10-CM

## 2020-02-12 DIAGNOSIS — N40.1 BENIGN PROSTATIC HYPERPLASIA WITH WEAK URINARY STREAM: Primary | ICD-10-CM

## 2020-02-12 DIAGNOSIS — R33.9 INCOMPLETE BLADDER EMPTYING: ICD-10-CM

## 2020-02-12 LAB
BILIRUB SERPL-MCNC: NORMAL MG/DL
BLOOD URINE, POC: NORMAL
COLOR, POC UA: YELLOW
GLUCOSE UR QL STRIP: 250
KETONES UR QL STRIP: NORMAL
LEUKOCYTE ESTERASE URINE, POC: NORMAL
NITRITE, POC UA: NORMAL
PH, POC UA: 5.5
PROTEIN, POC: NORMAL
SPECIFIC GRAVITY, POC UA: 1.02
UROBILINOGEN, POC UA: NORMAL

## 2020-02-12 PROCEDURE — 81002 URINALYSIS NONAUTO W/O SCOPE: CPT | Mod: S$GLB,,, | Performed by: UROLOGY

## 2020-02-12 PROCEDURE — 52000 CYSTOURETHROSCOPY: CPT | Mod: S$GLB,,, | Performed by: UROLOGY

## 2020-02-12 PROCEDURE — 81002 POCT URINE DIPSTICK WITHOUT MICROSCOPE: ICD-10-PCS | Mod: S$GLB,,, | Performed by: UROLOGY

## 2020-02-12 PROCEDURE — 52000 PR CYSTOURETHROSCOPY: ICD-10-PCS | Mod: S$GLB,,, | Performed by: UROLOGY

## 2020-02-12 PROCEDURE — 51798 PR MEAS,POST-VOID RES,US,NON-IMAGING: ICD-10-PCS | Mod: S$GLB,,, | Performed by: UROLOGY

## 2020-02-12 PROCEDURE — 51798 US URINE CAPACITY MEASURE: CPT | Mod: S$GLB,,, | Performed by: UROLOGY

## 2020-02-12 NOTE — PROCEDURES
Procedures     UROLOGY [place]  2 12 20     Cc microhematuria     Age 65, accompanied by wife. Pt is here for cystoscopic check.     Pt was being seen for bph, erectile dysfunction and epididymitis, but in July 2019 was noted to have microscopic hematuria. As part of the workup, the cystoscopy showed an 8 mm papillary lesion in the posterior wall near the trigone on the R side. We proceeded with cauterization of the lesion, and so no biopsy is available.     Urine cytology was negative for malignant cells, psa was normal at 0.65, postvoid residual was 1 ml, and renal ultrasound showed bilateral stone and slightly elevated resistive index (chronic medical disease).     CYSTOSCOPY olympus flexible. Anterior urethra normal. Posterior urethra 3.5 cm, with no bilobar growth and absence of midline touch of the lateral lobes. However, the posterior lobe of the prostate was very high, and scope had to be flexed markedly to surmount it. No bladder lesions were seen. Some trabeculation was found, but no deep cellulae or any diverticula. Pt tolerated the procedure well.    BLADDERSCAN 0  Ml residual urine     IMPRESSION:      Hx of small bladder tumor, 8 mm, posterior wall, cauterized completely 3 months ago, and therefore with no pathology report and no confirmation that it represented a malignant lesion.     Will now repeat the cysto in 6 months, and later in 1 year. I dont expect to need yearly cystos forever for this oncologically mild lesion.     Bph, with bladder outlet obstruction, with hesitancy, slow stream, and intermittency. I started pt on flomax to take one daily.      Nephrolithiasis (small size in calyceal positions), observation      hx of acute epididymitis, has remained with hypersensive epididymis, reassured in the past; subject did not come up on current visit.      Erectile dysfct, on pde5 inhibitors prn, subject did not come up in today's visit.     RTC 6 mo for cysto

## 2020-06-18 ENCOUNTER — TELEPHONE (OUTPATIENT)
Dept: GASTROENTEROLOGY | Facility: CLINIC | Age: 65
End: 2020-06-18

## 2020-07-17 DIAGNOSIS — Z01.812 PRE-PROCEDURE LAB EXAM: ICD-10-CM

## 2020-07-21 ENCOUNTER — LAB VISIT (OUTPATIENT)
Dept: FAMILY MEDICINE | Facility: CLINIC | Age: 65
End: 2020-07-21
Payer: COMMERCIAL

## 2020-07-21 DIAGNOSIS — Z01.812 PRE-PROCEDURE LAB EXAM: ICD-10-CM

## 2020-07-21 PROCEDURE — U0003 INFECTIOUS AGENT DETECTION BY NUCLEIC ACID (DNA OR RNA); SEVERE ACUTE RESPIRATORY SYNDROME CORONAVIRUS 2 (SARS-COV-2) (CORONAVIRUS DISEASE [COVID-19]), AMPLIFIED PROBE TECHNIQUE, MAKING USE OF HIGH THROUGHPUT TECHNOLOGIES AS DESCRIBED BY CMS-2020-01-R: HCPCS

## 2020-07-22 ENCOUNTER — TELEPHONE (OUTPATIENT)
Dept: GASTROENTEROLOGY | Facility: CLINIC | Age: 65
End: 2020-07-22

## 2020-07-22 LAB — SARS-COV-2 RNA RESP QL NAA+PROBE: NOT DETECTED

## 2020-07-22 NOTE — TELEPHONE ENCOUNTER
----- Message from Leonard AGARWAL Ashley sent at 7/22/2020  9:02 AM CDT -----  Regarding: Pre Procedural Covid Results  Contact: patient  Type:  Test Results    Who Called:  patient  Name of Test (Lab/Mammo/Etc):  Covid19 ,pre procedural  Date of Test:  7/21/20  Ordering Provider:  Asa  Where the test was performed: [address]  Best Call Back Number:  [phone]  Additional Information:  n/a

## 2020-07-22 NOTE — TELEPHONE ENCOUNTER
Spoke with pt. Informed covid test is still in process & that he will be informed if it comes back positive so he does not prep for c-scope. Pt verbalized understanding.

## 2020-07-24 ENCOUNTER — ANESTHESIA (OUTPATIENT)
Dept: ENDOSCOPY | Facility: HOSPITAL | Age: 65
End: 2020-07-24
Payer: COMMERCIAL

## 2020-07-24 ENCOUNTER — HOSPITAL ENCOUNTER (OUTPATIENT)
Facility: HOSPITAL | Age: 65
Discharge: HOME | End: 2020-07-24
Attending: INTERNAL MEDICINE | Admitting: INTERNAL MEDICINE
Payer: COMMERCIAL

## 2020-07-24 ENCOUNTER — ANESTHESIA EVENT (OUTPATIENT)
Dept: ENDOSCOPY | Facility: HOSPITAL | Age: 65
End: 2020-07-24
Payer: COMMERCIAL

## 2020-07-24 VITALS
SYSTOLIC BLOOD PRESSURE: 120 MMHG | RESPIRATION RATE: 18 BRPM | TEMPERATURE: 98 F | BODY MASS INDEX: 27.47 KG/M2 | OXYGEN SATURATION: 98 % | DIASTOLIC BLOOD PRESSURE: 74 MMHG | HEART RATE: 68 BPM | HEIGHT: 67 IN | WEIGHT: 175 LBS

## 2020-07-24 DIAGNOSIS — Z12.11 SCREEN FOR COLON CANCER: ICD-10-CM

## 2020-07-24 LAB — GLUCOSE SERPL-MCNC: 130 MG/DL (ref 70–110)

## 2020-07-24 PROCEDURE — 45385 COLONOSCOPY W/LESION REMOVAL: CPT | Mod: PO | Performed by: INTERNAL MEDICINE

## 2020-07-24 PROCEDURE — D9220A PRA ANESTHESIA: Mod: 33,CRNA,, | Performed by: NURSE ANESTHETIST, CERTIFIED REGISTERED

## 2020-07-24 PROCEDURE — 88305 TISSUE EXAM BY PATHOLOGIST: CPT | Mod: 26,,, | Performed by: PATHOLOGY

## 2020-07-24 PROCEDURE — D9220A PRA ANESTHESIA: ICD-10-PCS | Mod: 33,ANES,, | Performed by: ANESTHESIOLOGY

## 2020-07-24 PROCEDURE — 37000009 HC ANESTHESIA EA ADD 15 MINS: Mod: PO | Performed by: INTERNAL MEDICINE

## 2020-07-24 PROCEDURE — 45381 COLONOSCOPY SUBMUCOUS NJX: CPT | Mod: 51,,, | Performed by: INTERNAL MEDICINE

## 2020-07-24 PROCEDURE — 27201089 HC SNARE, DISP (ANY): Mod: PO | Performed by: INTERNAL MEDICINE

## 2020-07-24 PROCEDURE — 25000003 PHARM REV CODE 250: Mod: PO | Performed by: ANESTHESIOLOGY

## 2020-07-24 PROCEDURE — 82962 GLUCOSE BLOOD TEST: CPT | Mod: PO | Performed by: INTERNAL MEDICINE

## 2020-07-24 PROCEDURE — 63600175 PHARM REV CODE 636 W HCPCS: Mod: PO | Performed by: INTERNAL MEDICINE

## 2020-07-24 PROCEDURE — 45385 PR COLONOSCOPY,REMV LESN,SNARE: ICD-10-PCS | Mod: 33,,, | Performed by: INTERNAL MEDICINE

## 2020-07-24 PROCEDURE — 88305 TISSUE EXAM BY PATHOLOGIST: CPT | Mod: 59 | Performed by: PATHOLOGY

## 2020-07-24 PROCEDURE — 37000008 HC ANESTHESIA 1ST 15 MINUTES: Mod: PO | Performed by: INTERNAL MEDICINE

## 2020-07-24 PROCEDURE — 63600175 PHARM REV CODE 636 W HCPCS: Mod: PO | Performed by: NURSE ANESTHETIST, CERTIFIED REGISTERED

## 2020-07-24 PROCEDURE — 45381 COLONOSCOPY SUBMUCOUS NJX: CPT | Mod: PO | Performed by: INTERNAL MEDICINE

## 2020-07-24 PROCEDURE — 45385 COLONOSCOPY W/LESION REMOVAL: CPT | Mod: 33,,, | Performed by: INTERNAL MEDICINE

## 2020-07-24 PROCEDURE — 27201028 HC NEEDLE, SCLERO: Mod: PO | Performed by: INTERNAL MEDICINE

## 2020-07-24 PROCEDURE — 88305 TISSUE EXAM BY PATHOLOGIST: ICD-10-PCS | Mod: 26,,, | Performed by: PATHOLOGY

## 2020-07-24 PROCEDURE — 45381 PR COLONOSCPY,FLEX,W/DIR SUBMUC INJECT: ICD-10-PCS | Mod: 51,,, | Performed by: INTERNAL MEDICINE

## 2020-07-24 PROCEDURE — D9220A PRA ANESTHESIA: Mod: 33,ANES,, | Performed by: ANESTHESIOLOGY

## 2020-07-24 PROCEDURE — D9220A PRA ANESTHESIA: ICD-10-PCS | Mod: 33,CRNA,, | Performed by: NURSE ANESTHETIST, CERTIFIED REGISTERED

## 2020-07-24 RX ORDER — PROPOFOL 10 MG/ML
VIAL (ML) INTRAVENOUS CONTINUOUS PRN
Status: DISCONTINUED | OUTPATIENT
Start: 2020-07-24 | End: 2020-07-24

## 2020-07-24 RX ORDER — PROPOFOL 10 MG/ML
VIAL (ML) INTRAVENOUS
Status: DISCONTINUED | OUTPATIENT
Start: 2020-07-24 | End: 2020-07-24

## 2020-07-24 RX ORDER — LIDOCAINE HYDROCHLORIDE 20 MG/ML
INJECTION INTRAVENOUS
Status: DISCONTINUED | OUTPATIENT
Start: 2020-07-24 | End: 2020-07-24

## 2020-07-24 RX ORDER — SODIUM CHLORIDE 0.9 % (FLUSH) 0.9 %
10 SYRINGE (ML) INJECTION
Status: DISCONTINUED | OUTPATIENT
Start: 2020-07-24 | End: 2020-07-24 | Stop reason: HOSPADM

## 2020-07-24 RX ORDER — SODIUM CHLORIDE, SODIUM LACTATE, POTASSIUM CHLORIDE, CALCIUM CHLORIDE 600; 310; 30; 20 MG/100ML; MG/100ML; MG/100ML; MG/100ML
INJECTION, SOLUTION INTRAVENOUS CONTINUOUS
Status: DISCONTINUED | OUTPATIENT
Start: 2020-07-24 | End: 2020-07-24 | Stop reason: HOSPADM

## 2020-07-24 RX ORDER — LIDOCAINE HYDROCHLORIDE 10 MG/ML
1 INJECTION INFILTRATION; PERINEURAL ONCE
Status: COMPLETED | OUTPATIENT
Start: 2020-07-24 | End: 2020-07-24

## 2020-07-24 RX ADMIN — PROPOFOL 100 MG: 10 INJECTION, EMULSION INTRAVENOUS at 07:07

## 2020-07-24 RX ADMIN — PROPOFOL 150 MCG/KG/MIN: 10 INJECTION, EMULSION INTRAVENOUS at 07:07

## 2020-07-24 RX ADMIN — LIDOCAINE HYDROCHLORIDE 1 ML: 10 INJECTION, SOLUTION EPIDURAL; INFILTRATION; INTRACAUDAL; PERINEURAL at 06:07

## 2020-07-24 RX ADMIN — LIDOCAINE HYDROCHLORIDE 100 MG: 20 INJECTION, SOLUTION INTRAVENOUS at 07:07

## 2020-07-24 RX ADMIN — SODIUM CHLORIDE, SODIUM LACTATE, POTASSIUM CHLORIDE, AND CALCIUM CHLORIDE: .6; .31; .03; .02 INJECTION, SOLUTION INTRAVENOUS at 06:07

## 2020-07-24 NOTE — [employer] PATIENT INSTRUCTIONS
Discharge Summary/Instructions after an Endoscopic Procedure  Patient Name: Hima Mensah  Patient MRN: [id number]  Patient YOB: 1955 Friday, July 24, 2020  Chema Bray MD  RESTRICTIONS:  During your procedure today, you received medications for sedation.  These   medications may affect your judgment, balance and coordination.  Therefore,   for 24 hours, you have the following restrictions:   - DO NOT drive a car, operate machinery, make legal/financial decisions,   sign important papers or drink alcohol.    ACTIVITY:  Today: no heavy lifting, straining or running due to procedural   sedation/anesthesia.  The following day: return to full activity including work.  DIET:  Eat and drink normally unless instructed otherwise.     TREATMENT FOR COMMON SIDE EFFECTS:  - Mild abdominal pain, nausea, belching, bloating or excessive gas:  rest,   eat lightly and use a heating pad.  - Sore Throat: treat with throat lozenges and/or gargle with warm salt   water.  - Because air was used during the procedure, expelling large amounts of air   from your rectum or belching is normal.  - If a bowel prep was taken, you may not have a bowel movement for 1-3 days.    This is normal.  SYMPTOMS TO WATCH FOR AND REPORT TO YOUR PHYSICIAN:  1. Abdominal pain or bloating, other than gas cramps.  2. Chest pain.  3. Back pain.  4. Signs of infection such as: chills or fever occurring within 24 hours   after the procedure.  5. Rectal bleeding, which would show as bright red, maroon, or black stools.   (A tablespoon of blood from the rectum is not serious, especially if   hemorrhoids are present.)  6. Vomiting.  7. Weakness or dizziness.  GO DIRECTLY TO THE NEAREST EMERGENCY ROOM IF YOU HAVE ANY OF THE FOLLOWING:      Difficulty breathing              Chills and/or fever over 101 F   Persistent vomiting and/or vomiting blood   Severe abdominal pain   Severe chest pain   Black, tarry stools   Bleeding- more than one  tablespoon   Any other symptom or condition that you feel may need urgent attention  Your doctor recommends these additional instructions:  If any biopsies were taken, your doctors clinic will contact you in 1 to 2   weeks with any results.  We are waiting for your pathology results.   Your physician has recommended a repeat colonoscopy in five years for   surveillance based on pathology results.   You are being discharged to home.  For questions, problems or results please call your physician - Chema Bray MD at Work:  [phone].  EMERGENCY PHONE NUMBER: [phone], LAB RESULTS: [phone]  IF A COMPLICATION OR EMERGENCY SITUATION ARISES AND YOU ARE UNABLE TO REACH   YOUR PHYSICIAN - GO DIRECTLY TO THE EMERGENCY ROOM.  ___________________________________________  Nurse Signature  ___________________________________________  Patient/Designated Responsible Party Signature  Chema Bray MD  7/24/2020 7:19:59 AM  This report has been verified and signed electronically.  [employer]

## 2020-07-24 NOTE — DISCHARGE INSTRUCTIONS

## 2020-07-24 NOTE — H&P
History & Physical - Short Stay  Gastroenterology      SUBJECTIVE:     Procedure: Colonoscopy    Chief Complaint/Indication for Procedure: Screening    PTA Medications   Medication Sig    amLODIPine (NORVASC) 10 MG tablet TAKE 1 TABLET(10 MG) BY MOUTH EVERY NIGHT    aspirin 81 MG Chew Take 81 mg by mouth once daily. ASPIRIN 81 MG TABS    ezetimibe (ZETIA) 10 mg tablet TAKE 1 TABLET(10 MG) BY MOUTH EVERY DAY    flaxseed oil 1,000 mg Cap Take 3,000 mg by mouth once daily. (Patient taking differently: Take 2,600 mg by mouth once daily. )    gabapentin (NEURONTIN) 300 MG capsule TAKE 1 TO 2 CAPSULES(300  MG) BY MOUTH EVERY EVENING    glimepiride (AMARYL) 2 MG tablet TAKE 1 TABLET(2 MG) BY MOUTH EVERY MORNING    iron/vitamin B complex (GERITOL ORAL) Take by mouth.    losartan (COZAAR) 100 MG tablet TAKE 1 TABLET(100 MG) BY MOUTH EVERY MORNING    metFORMIN (GLUCOPHAGE) 500 MG tablet TAKE 2 TABLETS(1000 MG) BY MOUTH TWICE DAILY WITH MEALS    omeprazole (PRILOSEC) 40 MG capsule Take 1 capsule (40 mg total) by mouth once daily.    pioglitazone (ACTOS) 45 MG tablet TAKE 1 TABLET(45 MG) BY MOUTH EVERY DAY    saw-pygeum-nettle-pumpkn-aa#17 (PROSTATE HEALTH FORMULA) 410-244-26-33 mg Tab Take 1 tablet by mouth 2 (two) times daily.     sildenafil (REVATIO) 20 mg Tab Take 1 tablet (20 mg total) by mouth 3 (three) times daily.    tamsulosin (FLOMAX) 0.4 mg Cap Take 1 capsule (0.4 mg total) by mouth once daily.    testosterone cypionate (DEPOTESTOTERONE CYPIONATE) 100 mg/mL injection INJECT 1ML IN THE MUSCLE EVERY 14 DAYS    blood sugar diagnostic Strp 1 strip by Misc.(Non-Drug; Combo Route) route before breakfast. This is for One Tough Ultra test strips    ibuprofen (ADVIL,MOTRIN) 800 MG tablet TAKE 1 TABLET(800 MG) BY MOUTH THREE TIMES DAILY AS NEEDED (Patient not taking: Reported on 7/23/2020)    lancets Misc 1 lancet by Misc.(Non-Drug; Combo Route) route before breakfast. This is for One Touch Delica  "lancets       Review of patient's allergies indicates:   Allergen Reactions    Atorvastatin      Elevated liver enzymes    Pravastatin      Elevated liver enzymes    Simvastatin      Elevated liver enzymes    Penicillins Rash     Other reaction(s): Unknown        Past Medical History:   Diagnosis Date    a Abnormal EKG     Dr. Arnoldo Jeter; 5/23/16 Referred To Dr. Diana Hein    a Cardiac Diastolic Dysfunction     Dr. Arnoldo Jeter    a Family H/O CAD ###    "SEs q5yr;" Dr. Arnoldo Jeter; 2/14/20 LCST = Normal (Report Scanned); 8/1/14 GIOVANI (Dr. Willem Swartz) = Normal At Submaximal Heart Rate    a Palpitations     Dr. Arnoldo Jeter; 6/20/16 24-Hr Holter = 2 Brief PSVT Episodes And Rare PACs And PVCs    a PSVT ###    Dr. Arnoldo Jeter Based On Results Of A 6/20/16 24-Hr Holter = 2 Brief PSVT Episodes And Rare PACs And PVCs    b Hypertension     b Microalbuminuria     c Hypercholesterolemia With Low HDL     7/21/17 RXd OTC Flaxseed Oil 3K Mg Daily And D/Cd OTC FO 2K Mg Daily; On Zetia 10 Mg Daily; Statins raise His Liver Enzymes    d Type 2 DM     ** 2/24/19 Added Amaryl 2 Mg qAM; He Has Had ADA Training But Doesn't Follow It    g Mild Chronic NCNC Anemia     Am Monitoring    j Elevated ALT Level 7/15/17     Will Monitor    j GERD With Esophageal Stenosis     Dr. Mookie chan H/O Colon Polyps     Dr. Mookie chan Hemorrhoids     j Umbilical Hernia     k Early Stage Bladder Cancer ######    Dr. Louis Ordonez Removed This Polyp 11/2019    k Low Testosterone ###    Dr. Louis Ordonez; 1/17/18 RXd Testosterone 100 Mg Every 2 Weeks; 7/21/17 Increased From 2 Pumps To 3 Pumps Daily; On Topical Testosterone Supplement    l H/O Bilateral TKRs 2006     l Lumbar DDD S/P Sx 2011 With Chronic LBP     Dr. Alejandro Villegas    m Bilateral Lower Extremity Diabetic Peripheral Neuropathy     Mainly Mild Numbness In His Feet    m Chronic Fatigue ###    W/O Daytime Drowsiness Or Witnessed Apnea    p OU " Cataracts     Dr. Blake Mitchell    p OU Hypermetropia     Dr. Blake Mitchell    Wellness Visit 2/26/2020      Past Surgical History:   Procedure Laterality Date    BACK SURGERY      CIRCUMCISION, PRIMARY      COLONOSCOPY W/ POLYPECTOMY      ESOPHAGEAL DILATION      ESOPHAGOGASTRODUODENOSCOPY      JOINT REPLACEMENT      bilateral knee replacements    nerve block in spine       penis surgery (venous ligation)      Old treatmt for erect dys - (ligation of the dorsal vein of the penis, operation not currently practiced)    SPINE SURGERY      lumbar fusion    TOTAL KNEE ARTHROPLASTY      Bilateral     Family History   Problem Relation Age of Onset    Hypertension Mother     Stroke Mother     Cancer Mother         lung (started smoking age 13)    Cancer Father         lung (started smoking age 8)    Heart attack Brother         massive heart attack at age 34     Social History     Tobacco Use    Smoking status: Never Smoker    Smokeless tobacco: Never Used   Substance Use Topics    Alcohol use: No     Alcohol/week: 0.0 standard drinks    Drug use: Never         OBJECTIVE:     Vital Signs (Most Recent)  Temp: 97.5 °F (36.4 °C) (07/24/20 0636)  Pulse: 68 (07/24/20 0636)  Resp: 18 (07/24/20 0636)  BP: 138/74 (07/24/20 0636)  SpO2: 97 % (07/24/20 0636)    Physical Exam                                                        GENERAL:  Comfortable, in no acute distress.                                 HEENT EXAM:  Nonicteric.  No adenopathy.  Oropharynx is clear.               NECK:  Supple.                                                               LUNGS:  Clear.                                                               CARDIAC:  Regular rate and rhythm.  S1, S2.  No murmur.                      ABDOMEN:  Soft, positive bowel sounds, nontender.  No hepatosplenomegaly or masses.  No rebound or guarding.                                             EXTREMITIES:  No edema.     MENTAL STATUS:  Normal,  alert and oriented.      ASSESSMENT/PLAN:     Assessment: Colorectal cancer screening    Plan: Colonoscopy    Anesthesia Plan: General    ASA Grade: ASA 2 - Patient with mild systemic disease with no functional limitations    MALLAMPATI SCORE:  I (soft palate, uvula, fauces, and tonsillar pillars visible)     In my medical opinion and judgment, this medical or surgical procedure was not able to be safely postponed in accordance with [place] Department of Health, Healthcare Facility Notice #2020-COVID19-ALL-007.

## 2020-07-24 NOTE — ANESTHESIA PREPROCEDURE EVALUATION
07/24/2020  Hima Mensah is a 65 y.o., male.    Anesthesia Evaluation    I have reviewed the Patient Summary Reports.    I have reviewed the Nursing Notes.       Review of Systems  Anesthesia Hx:  No problems with previous Anesthesia    Cardiovascular:   Hypertension    Hepatic/GI:   GERD    Musculoskeletal:   Arthritis     Neurological:   Neuromuscular Disease,    Endocrine:   Diabetes        Physical Exam  General:  Well nourished    Airway/Jaw/Neck:  Airway Findings: Mouth Opening: Normal Tongue: Normal  General Airway Assessment: Adult  TM Distance: Normal, at least 6 cm  Jaw/Neck Findings:  Neck ROM: Normal ROM     Eyes/Ears/Nose:  Eyes/Ears/Nose Findings:    Dental:  Dental Findings:   Chest/Lungs:  Chest/Lungs Findings: Normal Respiratory Rate     Heart/Vascular:  Heart Findings: Rate: Normal  Rhythm: Regular Rhythm        Mental Status:  Mental Status Findings:  Cooperative, Alert and Oriented         Anesthesia Plan  Type of Anesthesia, risks & benefits discussed:  Anesthesia Type:  general  Patient's Preference: General  Intra-op Monitoring Plan: standard ASA monitors  Intra-op Monitoring Plan Comments:   Post Op Pain Control Plan:   Post Op Pain Control Plan Comments:   Induction:   IV  Beta Blocker:  Patient is not currently on a Beta-Blocker (No further documentation required).       Informed Consent: Patient understands risks and agrees with Anesthesia plan.  Questions answered. Anesthesia consent signed with patient.  ASA Score: 3     Day of Surgery Review of History & Physical:    H&P update referred to the surgeon.         Ready For Surgery From Anesthesia Perspective.

## 2020-07-24 NOTE — TRANSFER OF CARE
"Anesthesia Transfer of Care Note    Patient: Hima Mensah    Procedure(s) Performed: Procedure(s) (LRB):  COLONOSCOPY (N/A)    Patient location: PACU    Anesthesia Type: general    Transport from OR: Transported from OR on room air with adequate spontaneous ventilation    Post pain: adequate analgesia    Post assessment: no apparent anesthetic complications and tolerated procedure well    Post vital signs: stable    Level of consciousness: responds to stimulation    Nausea/Vomiting: no nausea/vomiting    Complications: none    Transfer of care protocol was followed      Last vitals:   Visit Vitals  /74 (BP Location: Right arm, Patient Position: Lying)   Pulse 68   Temp 36.4 °C (97.5 °F) (Skin)   Resp 18   Ht 5' 7" (1.702 m)   Wt 79.4 kg (175 lb)   SpO2 97%   BMI 27.41 kg/m²     "

## 2020-07-24 NOTE — DISCHARGE SUMMARY
Discharge Note  Short Stay      SUMMARY     Admit Date: 7/24/2020    Attending Physician: Chema Bray MD     Discharge Physician: Chema Bray MD    Discharge Date: 7/24/2020 7:20 AM    Final Diagnosis: Colon cancer screening [Z12.11]    Disposition: HOME OR SELF CARE    Patient Instructions:   Current Discharge Medication List      CONTINUE these medications which have NOT CHANGED    Details   amLODIPine (NORVASC) 10 MG tablet TAKE 1 TABLET(10 MG) BY MOUTH EVERY NIGHT  Qty: 90 tablet, Refills: 3      aspirin 81 MG Chew Take 81 mg by mouth once daily. ASPIRIN 81 MG TABS      ezetimibe (ZETIA) 10 mg tablet TAKE 1 TABLET(10 MG) BY MOUTH EVERY DAY  Qty: 90 tablet, Refills: 1      flaxseed oil 1,000 mg Cap Take 3,000 mg by mouth once daily.      gabapentin (NEURONTIN) 300 MG capsule TAKE 1 TO 2 CAPSULES(300  MG) BY MOUTH EVERY EVENING  Qty: 180 capsule, Refills: 3      glimepiride (AMARYL) 2 MG tablet TAKE 1 TABLET(2 MG) BY MOUTH EVERY MORNING  Qty: 90 tablet, Refills: 3      iron/vitamin B complex (GERITOL ORAL) Take by mouth.      losartan (COZAAR) 100 MG tablet TAKE 1 TABLET(100 MG) BY MOUTH EVERY MORNING  Qty: 90 tablet, Refills: 1      metFORMIN (GLUCOPHAGE) 500 MG tablet TAKE 2 TABLETS(1000 MG) BY MOUTH TWICE DAILY WITH MEALS  Qty: 360 tablet, Refills: 3      omeprazole (PRILOSEC) 40 MG capsule Take 1 capsule (40 mg total) by mouth once daily.  Qty: 90 capsule, Refills: 3      pioglitazone (ACTOS) 45 MG tablet TAKE 1 TABLET(45 MG) BY MOUTH EVERY DAY  Qty: 90 tablet, Refills: 3      saw-pygeum-nettle-pumpkn-aa#17 (PROSTATE HEALTH FORMULA) [id number] mg Tab Take 1 tablet by mouth 2 (two) times daily.       sildenafil (REVATIO) 20 mg Tab Take 1 tablet (20 mg total) by mouth 3 (three) times daily.  Qty: 80 tablet, Refills: 11    Associated Diagnoses: Erectile dysfunction, unspecified erectile dysfunction type      tamsulosin (FLOMAX) 0.4 mg Cap Take 1 capsule (0.4 mg total) by mouth once  daily.  Qty: 90 capsule, Refills: 3    Associated Diagnoses: Benign prostatic hyperplasia with urinary obstruction; Weak urinary stream      testosterone cypionate (DEPOTESTOTERONE CYPIONATE) 100 mg/mL injection INJECT 1ML IN THE MUSCLE EVERY 14 DAYS  Qty: 10 mL, Refills: 1      blood sugar diagnostic Strp 1 strip by Misc.(Non-Drug; Combo Route) route before breakfast. This is for One Tough Ultra test strips  Qty: 100 strip, Refills: 3      ibuprofen (ADVIL,MOTRIN) 800 MG tablet TAKE 1 TABLET(800 MG) BY MOUTH THREE TIMES DAILY AS NEEDED  Qty: 270 tablet, Refills: 0    Associated Diagnoses: Pain      lancets Misc 1 lancet by Misc.(Non-Drug; Combo Route) route before breakfast. This is for One Touch Delica lancets  Qty: 100 each, Refills: 3             Discharge Procedure Orders (must include Diet, Follow-up, Activity)    Follow Up:  Follow up with PCP as previously scheduled  Resume routine diet.  Activity as tolerated.    No driving day of procedure.

## 2020-07-27 NOTE — ANESTHESIA POSTPROCEDURE EVALUATION
Anesthesia Post Evaluation    Patient: Hima Mensah    Procedure(s) Performed: Procedure(s) (LRB):  COLONOSCOPY (N/A)    Final Anesthesia Type: general    Patient location during evaluation: PACU  Patient participation: Yes- Able to Participate  Level of consciousness: awake and alert, oriented and awake  Post-procedure vital signs: reviewed and stable  Pain management: adequate  Airway patency: patent    PONV status at discharge: No PONV  Anesthetic complications: no      Cardiovascular status: blood pressure returned to baseline and hemodynamically stable  Respiratory status: unassisted, spontaneous ventilation and room air  Hydration status: euvolemic  Follow-up not needed.          Vitals Value Taken Time   /74 07/24/20 0750   Temp 36.6 °C (97.8 °F) 07/24/20 0720   Pulse 68 07/24/20 0750   Resp 18 07/24/20 0750   SpO2 98 % 07/24/20 0750         Event Time   Out of Recovery 07:52:21         Pain/Tracy Score: No data recorded

## 2020-07-29 LAB
FINAL PATHOLOGIC DIAGNOSIS: NORMAL
GROSS: NORMAL

## 2020-07-31 ENCOUNTER — TELEPHONE (OUTPATIENT)
Dept: GASTROENTEROLOGY | Facility: CLINIC | Age: 65
End: 2020-07-31

## 2020-07-31 NOTE — TELEPHONE ENCOUNTER
----- Message from Loreta Sood sent at 7/31/2020 12:04 PM CDT -----  Regarding: Results  Contact: PT  PT called to get the results from his colonoscopy from last week. Please call back     Callback: [phone]

## 2020-08-18 ENCOUNTER — PROCEDURE VISIT (OUTPATIENT)
Dept: UROLOGY | Facility: CLINIC | Age: 65
End: 2020-08-18
Payer: COMMERCIAL

## 2020-08-18 VITALS
BODY MASS INDEX: 27.48 KG/M2 | HEART RATE: 70 BPM | HEIGHT: 67 IN | SYSTOLIC BLOOD PRESSURE: 111 MMHG | WEIGHT: 175.06 LBS | DIASTOLIC BLOOD PRESSURE: 67 MMHG

## 2020-08-18 DIAGNOSIS — R31.29 MICROSCOPIC HEMATURIA: ICD-10-CM

## 2020-08-18 DIAGNOSIS — R33.9 INCOMPLETE BLADDER EMPTYING: ICD-10-CM

## 2020-08-18 DIAGNOSIS — Z85.51 HX OF BLADDER CANCER: Primary | ICD-10-CM

## 2020-08-18 PROCEDURE — 52000 CYSTOURETHROSCOPY: CPT | Mod: S$GLB,,, | Performed by: UROLOGY

## 2020-08-18 PROCEDURE — 51798 PR MEAS,POST-VOID RES,US,NON-IMAGING: ICD-10-PCS | Mod: S$GLB,,, | Performed by: UROLOGY

## 2020-08-18 PROCEDURE — 51798 US URINE CAPACITY MEASURE: CPT | Mod: S$GLB,,, | Performed by: UROLOGY

## 2020-08-18 PROCEDURE — 52000 PR CYSTOURETHROSCOPY: ICD-10-PCS | Mod: S$GLB,,, | Performed by: UROLOGY

## 2020-08-18 NOTE — PROGRESS NOTES
UROLOGY [place]  8 18 20       Cc bladder check     Age 65,     accompanied by wife. Pt is here for cystoscopic check.      Pt was being seen for bph, erectile dysfunction and epididymitis, but in July 2019 was noted to have microscopic hematuria. As part of the workup, the cystoscopy showed an 8 mm papillary lesion in the posterior wall near the trigone on the R side. We proceeded with cauterization of the lesion, and so no biopsy is available.      Urine cytology was negative for malignant cells, psa was normal at 0.65, postvoid residual was 1 ml, and renal ultrasound showed bilateral stone and slightly elevated resistive index (chronic medical disease).      CYSTOSCOPY olympus flexible. Anterior urethra normal. Posterior urethra 3.5 cm, with no bilobar growth and absence of midline touch of the lateral lobes. However, the posterior lobe of the prostate was very high, and scope had to be flexed markedly to surmount it. No bladder lesions were seen. Some trabeculation was found, but no deep cellulae or any diverticula. Pt tolerated the procedure well.     BLADDERSCAN 0  Ml residual urine     IMPRESSION:      Hx of small bladder tumor, 8 mm, posterior wall, cauterized completely 3 months ago, and therefore with no pathology report and no confirmation that it represented a malignant lesion.      Will now repeat the cysto in 6 months, and later in 1 year. I dont expect to need yearly cystos forever for this oncologically mild lesion.     Bph, with bladder outlet obstruction, with hesitancy, slow stream, and intermittency. I started pt on flomax to take one daily.      Nephrolithiasis (small size in calyceal positions), observation      hx of acute epididymitis, has remained with hypersensive epididymis, reassured in the past; subject did not come up on current visit.      Erectile dysfct, on pde5 inhibitors prn, subject did not come up in today's visit.     RTC 6 mo for cysto

## 2020-08-18 NOTE — PROCEDURES
Procedures        UROLOGY [place]  8 18 20     Cc microhematuria     Age 65, here for cystoscopic check.      CYSTO    Pt was being seen for bph, erectile dysfunction and epididymitis, but in July 2019 was noted to have microscopic hematuria, and as part of the workup, during the cystoscopy was found to have an 8 mm papillary lesion in the posterior wall near the trigone on the R side. We proceeded with cauterization of the lesion, and so no biopsy is available.      Urine cytology was negative for malignant cells, psa was normal at 0.65, postvoid residual was 1 ml, and renal ultrasound showed bilateral stone and slightly elevated resistive index (chronic medical disease). a follow up cystoscopy in feb 2020 was negative for recurrence.      Abdomen shows 5 cm umbilical hernia.   External genitalia normal  CYSTOSCOPY 8 18 20 olympus flexible. Anterior urethra normal. Posterior urethra 3.5 cm, there is no definite obstruction with lateral lobes not contacting in the midline. Bladder cavity was inspected systematically and no lesions were seen. Minimal trabeculation was found. Pt tolerated the procedure well.     BLADDERSCAN 0  Ml residual urine     IMPRESSION:      Negative cystoscopy today. Has hx of small bladder tumor, 8 mm, posterior wall, cauterized completely in July 2019, with no recurrences.       Can now repeat the cysto in one year. We have no confirmation that the growth cauterized one year ago was cancer. I dont expect to need yearly cystos forever for this oncologically mild lesion.     Is on flomax for mild bph. Can stay on it depending on symptoms.      Nephrolithiasis (small size in calyceal positions), observation      hx of acute epididymitis, has remained with hypersensive epididymis, reassured in the past     Erectile dysfct, on pde5 inhibitors prn     RTC 1 yr for cysto

## 2020-08-26 PROBLEM — Z12.11 SCREEN FOR COLON CANCER: Status: RESOLVED | Noted: 2020-07-24 | Resolved: 2020-08-26

## 2020-09-30 ENCOUNTER — OFFICE VISIT (OUTPATIENT)
Dept: UROLOGY | Facility: CLINIC | Age: 65
End: 2020-09-30
Payer: COMMERCIAL

## 2020-09-30 VITALS
HEART RATE: 80 BPM | DIASTOLIC BLOOD PRESSURE: 93 MMHG | WEIGHT: 177 LBS | HEIGHT: 67 IN | SYSTOLIC BLOOD PRESSURE: 155 MMHG | BODY MASS INDEX: 27.78 KG/M2

## 2020-09-30 DIAGNOSIS — R39.12 BENIGN PROSTATIC HYPERPLASIA WITH WEAK URINARY STREAM: ICD-10-CM

## 2020-09-30 DIAGNOSIS — N40.1 BENIGN PROSTATIC HYPERPLASIA WITH WEAK URINARY STREAM: ICD-10-CM

## 2020-09-30 DIAGNOSIS — Z85.51 HX OF BLADDER CANCER: Primary | ICD-10-CM

## 2020-09-30 PROCEDURE — 3008F BODY MASS INDEX DOCD: CPT | Mod: CPTII,S$GLB,, | Performed by: UROLOGY

## 2020-09-30 PROCEDURE — 99214 OFFICE O/P EST MOD 30 MIN: CPT | Mod: S$GLB,,, | Performed by: UROLOGY

## 2020-09-30 PROCEDURE — 3080F PR MOST RECENT DIASTOLIC BLOOD PRESSURE >= 90 MM HG: ICD-10-PCS | Mod: CPTII,S$GLB,, | Performed by: UROLOGY

## 2020-09-30 PROCEDURE — 3080F DIAST BP >= 90 MM HG: CPT | Mod: CPTII,S$GLB,, | Performed by: UROLOGY

## 2020-09-30 PROCEDURE — 3077F SYST BP >= 140 MM HG: CPT | Mod: CPTII,S$GLB,, | Performed by: UROLOGY

## 2020-09-30 PROCEDURE — 3077F PR MOST RECENT SYSTOLIC BLOOD PRESSURE >= 140 MM HG: ICD-10-PCS | Mod: CPTII,S$GLB,, | Performed by: UROLOGY

## 2020-09-30 PROCEDURE — 99999 PR PBB SHADOW E&M-EST. PATIENT-LVL IV: ICD-10-PCS | Mod: PBBFAC,,, | Performed by: UROLOGY

## 2020-09-30 PROCEDURE — 99214 PR OFFICE/OUTPT VISIT, EST, LEVL IV, 30-39 MIN: ICD-10-PCS | Mod: S$GLB,,, | Performed by: UROLOGY

## 2020-09-30 PROCEDURE — 3008F PR BODY MASS INDEX (BMI) DOCUMENTED: ICD-10-PCS | Mod: CPTII,S$GLB,, | Performed by: UROLOGY

## 2020-09-30 PROCEDURE — 99999 PR PBB SHADOW E&M-EST. PATIENT-LVL IV: CPT | Mod: PBBFAC,,, | Performed by: UROLOGY

## 2020-09-30 RX ORDER — TAMSULOSIN HYDROCHLORIDE 0.4 MG/1
0.8 CAPSULE ORAL DAILY
Qty: 60 CAPSULE | Refills: 11 | Status: SHIPPED | OUTPATIENT
Start: 2020-09-30 | End: 2021-07-19

## 2020-09-30 NOTE — PROGRESS NOTES
UROLOGY [place]  9 30 20        Cc yearly christopher and interested in implant     Age 65, accompanied by wife. Pt came for cystoscopic check six weeks ago, as in July 2019 was noted to have microscopic hematuria and an 8 mm papillary lesion in the posterior wall near the trigone. we cauterized the lesion, so no pathology is available.     Our follow up cysto six weeks ago no bladder lesions were seen. A bladder scan was also done and it was 0 ml.    He is also followed for bph and takes flomax daily. Also has hx of calyceal stones, on observation.    Is not interested in a penile prosthesis, as he says he has no help from the pde5 inhibitors, and has tried the intracavernosal injections and the vacuum devices and none of them provided relief.      On exam, abd soft nontender, no masses  cva's neg to fist percussion  CHRISTOPHER normal rectal sphincter, no mucosal lesions. Prostate 30 g, symmetric, benign.    psa 0.67 six months ago    IMP  bph on flomax  Nephrolithiasis on observation  Chronic hypersensitive epididymis, on observation  Organic erectile dysfunction, interested in being counseled on penile implant. I already explained the pros and cons of the penile implant, 3-piece device. Pt wants to be sent 'to the most experienced surgeon'. I am sending him to dr tracey washington at [facility]          Counseled 35 min  Over 50% of time in counseling

## 2020-10-21 ENCOUNTER — TELEPHONE (OUTPATIENT)
Dept: UROLOGY | Facility: CLINIC | Age: 65
End: 2020-10-21

## 2020-10-21 DIAGNOSIS — R39.12 BENIGN PROSTATIC HYPERPLASIA WITH WEAK URINARY STREAM: Primary | ICD-10-CM

## 2020-10-21 DIAGNOSIS — N40.1 BENIGN PROSTATIC HYPERPLASIA WITH WEAK URINARY STREAM: Primary | ICD-10-CM

## 2020-10-21 NOTE — TELEPHONE ENCOUNTER
patient states the flomax is not helping and he would like to be on medication for shrinking the prostate. Please advise

## 2020-10-22 RX ORDER — DUTASTERIDE 0.5 MG/1
0.5 CAPSULE, LIQUID FILLED ORAL DAILY
Qty: 30 CAPSULE | Refills: 11 | Status: SHIPPED | OUTPATIENT
Start: 2020-10-22 | End: 2021-07-19 | Stop reason: SDUPTHER

## 2020-10-22 NOTE — TELEPHONE ENCOUNTER
In view of his request, please let him know that I have ordered avodart (sent to pharmacy). He would be taking this in addition to his flomax.  He has to give the avodart a little time to kick in.

## 2021-07-09 ENCOUNTER — TELEPHONE (OUTPATIENT)
Dept: UROLOGY | Facility: CLINIC | Age: 66
End: 2021-07-09

## 2021-08-06 DIAGNOSIS — Z20.822 ENCOUNTER FOR LABORATORY TESTING FOR COVID-19 VIRUS: Primary | ICD-10-CM

## 2021-08-07 ENCOUNTER — LAB VISIT (OUTPATIENT)
Dept: FAMILY MEDICINE | Facility: CLINIC | Age: 66
End: 2021-08-07
Payer: MEDICARE

## 2021-08-07 DIAGNOSIS — Z20.822 ENCOUNTER FOR LABORATORY TESTING FOR COVID-19 VIRUS: ICD-10-CM

## 2021-08-07 PROCEDURE — U0005 INFEC AGEN DETEC AMPLI PROBE: HCPCS | Performed by: FAMILY MEDICINE

## 2021-08-07 PROCEDURE — U0003 INFECTIOUS AGENT DETECTION BY NUCLEIC ACID (DNA OR RNA); SEVERE ACUTE RESPIRATORY SYNDROME CORONAVIRUS 2 (SARS-COV-2) (CORONAVIRUS DISEASE [COVID-19]), AMPLIFIED PROBE TECHNIQUE, MAKING USE OF HIGH THROUGHPUT TECHNOLOGIES AS DESCRIBED BY CMS-2020-01-R: HCPCS | Performed by: FAMILY MEDICINE

## 2021-08-09 LAB
SARS-COV-2 RNA RESP QL NAA+PROBE: NOT DETECTED
SARS-COV-2- CYCLE NUMBER: -1

## 2021-08-10 ENCOUNTER — PATIENT MESSAGE (OUTPATIENT)
Dept: PAIN MEDICINE | Facility: CLINIC | Age: 66
End: 2021-08-10

## 2021-08-13 ENCOUNTER — TELEPHONE (OUTPATIENT)
Dept: UROLOGY | Facility: CLINIC | Age: 66
End: 2021-08-13

## 2021-08-23 RX ORDER — TAMSULOSIN HYDROCHLORIDE 0.4 MG/1
0.8 CAPSULE ORAL DAILY
COMMUNITY
Start: 2021-07-29 | End: 2021-11-04 | Stop reason: SDUPTHER

## 2021-08-24 ENCOUNTER — PROCEDURE VISIT (OUTPATIENT)
Dept: UROLOGY | Facility: CLINIC | Age: 66
End: 2021-08-24
Payer: MEDICARE

## 2021-08-24 VITALS
DIASTOLIC BLOOD PRESSURE: 81 MMHG | HEART RATE: 81 BPM | BODY MASS INDEX: 28.68 KG/M2 | HEIGHT: 67 IN | WEIGHT: 182.75 LBS | SYSTOLIC BLOOD PRESSURE: 141 MMHG

## 2021-08-24 DIAGNOSIS — R31.29 MICROSCOPIC HEMATURIA: ICD-10-CM

## 2021-08-24 DIAGNOSIS — Z85.51 HX OF BLADDER CANCER: Primary | ICD-10-CM

## 2021-08-24 LAB
BILIRUB SERPL-MCNC: NORMAL MG/DL
BLOOD URINE, POC: NORMAL
CLARITY, POC UA: CLEAR
COLOR, POC UA: YELLOW
GLUCOSE UR QL STRIP: 100
KETONES UR QL STRIP: NORMAL
LEUKOCYTE ESTERASE URINE, POC: NORMAL
NITRITE, POC UA: NORMAL
PH, POC UA: 5
PROTEIN, POC: NORMAL
SPECIFIC GRAVITY, POC UA: 1.03
UROBILINOGEN, POC UA: NORMAL

## 2021-08-24 PROCEDURE — 52000 PR CYSTOURETHROSCOPY: ICD-10-PCS | Mod: S$GLB,,, | Performed by: UROLOGY

## 2021-08-24 PROCEDURE — 52000 CYSTOURETHROSCOPY: CPT | Mod: S$GLB,,, | Performed by: UROLOGY

## 2021-08-24 PROCEDURE — 81002 URINALYSIS NONAUTO W/O SCOPE: CPT | Mod: S$GLB,,, | Performed by: UROLOGY

## 2021-08-24 PROCEDURE — 81002 POCT URINE DIPSTICK WITHOUT MICROSCOPE: ICD-10-PCS | Mod: S$GLB,,, | Performed by: UROLOGY

## 2021-11-04 DIAGNOSIS — R39.12 BENIGN PROSTATIC HYPERPLASIA WITH WEAK URINARY STREAM: Primary | ICD-10-CM

## 2021-11-04 DIAGNOSIS — N40.1 BENIGN PROSTATIC HYPERPLASIA WITH WEAK URINARY STREAM: Primary | ICD-10-CM

## 2021-11-04 RX ORDER — TAMSULOSIN HYDROCHLORIDE 0.4 MG/1
0.8 CAPSULE ORAL DAILY
Qty: 60 CAPSULE | Refills: 11 | Status: SHIPPED | OUTPATIENT
Start: 2021-11-04 | End: 2021-11-05 | Stop reason: SDUPTHER

## 2021-11-05 RX ORDER — TAMSULOSIN HYDROCHLORIDE 0.4 MG/1
0.8 CAPSULE ORAL DAILY
Qty: 60 CAPSULE | Refills: 11 | Status: SHIPPED | OUTPATIENT
Start: 2021-11-05 | End: 2022-01-01 | Stop reason: CLARIF

## 2021-11-10 NOTE — TELEPHONE ENCOUNTER
----- Message from Damaris Aggarwal sent at 6/18/2020 10:32 AM CDT -----  Regarding: Schedule Colonoscopy  Contact: self  Patient want to speak with a nurse regarding scheduling colonoscopy please call back at [phone] (home)     Case number [id number]    
Spoke with pt. Scheduled c-scope & covid test. Prep instructions & reminder letters placed in mail. Pt verbalized understanding to all.   
Detail Level: Detailed
Render Note In Bullet Format When Appropriate: No
Show Applicator Variable?: Yes
Post-Care Instructions: I reviewed with the patient in detail post-care instructions. Patient is to wear sunprotection, and avoid picking at any of the treated lesions. Pt may apply Vaseline to crusted or scabbing areas.
Duration Of Freeze Thaw-Cycle (Seconds): 10
Number Of Freeze-Thaw Cycles: 1 freeze-thaw cycle
Consent: The patient's consent was obtained including but not limited to risks of crusting, scabbing, blistering, scarring, darker or lighter pigmentary change, recurrence, incomplete removal and infection.

## 2022-01-01 PROBLEM — K56.2 VOLVULUS: Status: ACTIVE | Noted: 2022-01-01

## 2022-01-04 DIAGNOSIS — U07.1 COVID-19 VIRUS DETECTED: ICD-10-CM

## 2022-01-19 ENCOUNTER — OFFICE VISIT (OUTPATIENT)
Dept: GASTROENTEROLOGY | Facility: CLINIC | Age: 67
End: 2022-01-19
Payer: MEDICARE

## 2022-01-19 ENCOUNTER — TELEPHONE (OUTPATIENT)
Dept: UROLOGY | Facility: CLINIC | Age: 67
End: 2022-01-19
Payer: MEDICARE

## 2022-01-19 VITALS — WEIGHT: 170.88 LBS | BODY MASS INDEX: 26.82 KG/M2 | HEIGHT: 67 IN

## 2022-01-19 DIAGNOSIS — R63.4 WEIGHT LOSS: ICD-10-CM

## 2022-01-19 DIAGNOSIS — Z85.51 HX OF BLADDER CANCER: Primary | ICD-10-CM

## 2022-01-19 DIAGNOSIS — Z01.818 PRE-OP TESTING: ICD-10-CM

## 2022-01-19 DIAGNOSIS — D50.9 IRON DEFICIENCY ANEMIA, UNSPECIFIED IRON DEFICIENCY ANEMIA TYPE: ICD-10-CM

## 2022-01-19 DIAGNOSIS — K63.1 SMALL BOWEL PERFORATION: Primary | ICD-10-CM

## 2022-01-19 DIAGNOSIS — Z86.010 HISTORY OF COLON POLYPS: ICD-10-CM

## 2022-01-19 DIAGNOSIS — Z87.19 HISTORY OF GASTROESOPHAGEAL REFLUX (GERD): ICD-10-CM

## 2022-01-19 PROCEDURE — 99204 OFFICE O/P NEW MOD 45 MIN: CPT | Mod: S$GLB,,, | Performed by: NURSE PRACTITIONER

## 2022-01-19 PROCEDURE — 3288F PR FALLS RISK ASSESSMENT DOCUMENTED: ICD-10-PCS | Mod: CPTII,S$GLB,, | Performed by: NURSE PRACTITIONER

## 2022-01-19 PROCEDURE — 1126F PR PAIN SEVERITY QUANTIFIED, NO PAIN PRESENT: ICD-10-PCS | Mod: CPTII,S$GLB,, | Performed by: NURSE PRACTITIONER

## 2022-01-19 PROCEDURE — 1111F DSCHRG MED/CURRENT MED MERGE: CPT | Mod: CPTII,S$GLB,, | Performed by: NURSE PRACTITIONER

## 2022-01-19 PROCEDURE — 1160F PR REVIEW ALL MEDS BY PRESCRIBER/CLIN PHARMACIST DOCUMENTED: ICD-10-PCS | Mod: CPTII,S$GLB,, | Performed by: NURSE PRACTITIONER

## 2022-01-19 PROCEDURE — 3051F PR MOST RECENT HEMOGLOBIN A1C LEVEL 7.0 - < 8.0%: ICD-10-PCS | Mod: CPTII,S$GLB,, | Performed by: NURSE PRACTITIONER

## 2022-01-19 PROCEDURE — 99204 PR OFFICE/OUTPT VISIT, NEW, LEVL IV, 45-59 MIN: ICD-10-PCS | Mod: S$GLB,,, | Performed by: NURSE PRACTITIONER

## 2022-01-19 PROCEDURE — 1101F PR PT FALLS ASSESS DOC 0-1 FALLS W/OUT INJ PAST YR: ICD-10-PCS | Mod: CPTII,S$GLB,, | Performed by: NURSE PRACTITIONER

## 2022-01-19 PROCEDURE — 1101F PT FALLS ASSESS-DOCD LE1/YR: CPT | Mod: CPTII,S$GLB,, | Performed by: NURSE PRACTITIONER

## 2022-01-19 PROCEDURE — 3008F BODY MASS INDEX DOCD: CPT | Mod: CPTII,S$GLB,, | Performed by: NURSE PRACTITIONER

## 2022-01-19 PROCEDURE — 99999 PR PBB SHADOW E&M-EST. PATIENT-LVL IV: ICD-10-PCS | Mod: PBBFAC,,, | Performed by: NURSE PRACTITIONER

## 2022-01-19 PROCEDURE — 3008F PR BODY MASS INDEX (BMI) DOCUMENTED: ICD-10-PCS | Mod: CPTII,S$GLB,, | Performed by: NURSE PRACTITIONER

## 2022-01-19 PROCEDURE — 1160F RVW MEDS BY RX/DR IN RCRD: CPT | Mod: CPTII,S$GLB,, | Performed by: NURSE PRACTITIONER

## 2022-01-19 PROCEDURE — 1159F PR MEDICATION LIST DOCUMENTED IN MEDICAL RECORD: ICD-10-PCS | Mod: CPTII,S$GLB,, | Performed by: NURSE PRACTITIONER

## 2022-01-19 PROCEDURE — 3051F HG A1C>EQUAL 7.0%<8.0%: CPT | Mod: CPTII,S$GLB,, | Performed by: NURSE PRACTITIONER

## 2022-01-19 PROCEDURE — 1159F MED LIST DOCD IN RCRD: CPT | Mod: CPTII,S$GLB,, | Performed by: NURSE PRACTITIONER

## 2022-01-19 PROCEDURE — 1126F AMNT PAIN NOTED NONE PRSNT: CPT | Mod: CPTII,S$GLB,, | Performed by: NURSE PRACTITIONER

## 2022-01-19 PROCEDURE — 1111F PR DISCHARGE MEDS RECONCILED W/ CURRENT OUTPATIENT MED LIST: ICD-10-PCS | Mod: CPTII,S$GLB,, | Performed by: NURSE PRACTITIONER

## 2022-01-19 PROCEDURE — 3288F FALL RISK ASSESSMENT DOCD: CPT | Mod: CPTII,S$GLB,, | Performed by: NURSE PRACTITIONER

## 2022-01-19 PROCEDURE — 99999 PR PBB SHADOW E&M-EST. PATIENT-LVL IV: CPT | Mod: PBBFAC,,, | Performed by: NURSE PRACTITIONER

## 2022-01-19 NOTE — PROGRESS NOTES
"Subjective:       Patient ID: Hima Mensah is a 66 y.o. male, Body mass index is 26.76 kg/m².    Chief Complaint: ER Follow Up/ Small Bowel Perforation      Patient is new to me. Established patient of Dr. Bray.     Reviewed hospital discharge summary from 1/4/22: "Hospital Course (synopsis of major diagnoses, care, treatment, and services provided during the course of the hospital stay): < 30 minutes spent with discharge.  66 year old male presented to emergency room because of abdominal pain.  CT scan demonstrated small intestine perforation.  Perforation appeared contained.  Patient had tested Covid positive 2 days prior to presentation, again tested Covid positive in emergency room.  He was treated with IV antibiotics.  Abdominal pain improved.  He was tolerating solid diet without pain the morning of discharge.  Dm was managed with sliding scale insulin."    Abdominal Pain  This is a new problem. The current episode started 1 to 4 weeks ago. The onset quality is sudden. The problem occurs constantly. Duration: lasted for days. The problem has been resolved (denies abdominal pain since 1/7/22). The pain is located in the epigastric region. The quality of the pain is aching and sharp. The abdominal pain radiates to the periumbilical region. Associated symptoms include anorexia, nausea and weight loss (documented weight loss of 12 pounds since 8/2021; attributes to Jardiance). Pertinent negatives include no belching, constipation, diarrhea, dysuria, fever, flatus, frequency, hematochezia, melena or vomiting. The pain is aggravated by movement, eating and palpation. The pain is relieved by nothing. He has tried antibiotics (Past: IV antibiotics and oral Cipro and Flagyl- symptoms resolved) for the symptoms. Prior diagnostic workup includes GI consult and CT scan (surgery consult). His past medical history is significant for GERD (Hx of GERD- well controlled taking Omeprazole). There is no history of abdominal " surgery, colon cancer, Crohn's disease, gallstones, irritable bowel syndrome, pancreatitis, PUD or ulcerative colitis (Hx of MARIO- taking OTC oral iron supplement daily).     Review of Systems   Constitutional: Positive for weight loss (documented weight loss of 12 pounds since 8/2021; attributes to Jardiance). Negative for appetite change, fever and unexpected weight change.   HENT: Negative for trouble swallowing.    Respiratory: Negative for cough and shortness of breath.    Cardiovascular: Negative for chest pain.   Gastrointestinal: Positive for abdominal pain, anorexia and nausea. Negative for abdominal distention, anal bleeding, blood in stool, constipation, diarrhea, flatus, hematochezia, melena, rectal pain and vomiting.   Genitourinary: Negative for difficulty urinating, dysuria and frequency.   Musculoskeletal: Negative for gait problem.   Skin: Negative for rash.   Neurological: Negative for speech difficulty.   Psychiatric/Behavioral: Negative for confusion.       Past Medical History:   Diagnosis Date    a Abnormal EKG     Dr. Arnoldo Jeter; 04/2021 LHC/Angiogram (Dr. ADEOLA Jeter) = Was Reportedly Normal (Per Patient's Report)    a Cardiac Diastolic Dysfunction     Dr. Arnoldo Jeter    a Family H/O CAD ###    Dr. Arnoldo Jeter; 2/14/20 LCST = Normal (Report Scanned); 8/1/14 GIOVANI (Dr. Willem Swartz) = Normal At Submaximal Heart Rate    a Palpitations     Dr. Arnoldo Jeter; 6/20/16 24-Hr Holter = 2 Brief PSVT Episodes And Rare PACs And PVCs    a PSVT ###    Dr. Arnoldo Jeter Based On Results Of A 6/20/16 24-Hr Holter = 2 Brief PSVT Episodes And Rare PACs And PVCs    b Hypertension     b Microalbuminuria     c Hypercholesterolemia With Low HDL     7/21/17 RXd OTC Flaxseed Oil 3K Mg Daily And D/Cd OTC FO 2K Mg Daily; On Zetia 10 Mg Daily; Statins raise His Liver Enzymes    c Mildly Elevated CK Level 7/22/21 ###    d Type 2 Diabetes Mellitus     2/24/19 Added Amaryl 2 Mg qAM; He Has Had ADA Training But  "Doesn't Follow It    g Mild Chronic NCNC Anemia     Am Monitoring    j Chronic Mildly Elevated ALT Levels     7/27/21 RXd OTC Vitamin E 800 IU Daily    j GERD With Esophageal Stenosis     Dr. Mookie Bray    j H/O Adenomatous Colon Polyps On 7/24/20 TC     Dr. Chema Bray: "Repeat TC In 5 Years"    j Hemorrhoids     j Hepatic Steatosis     7/26/21 AA U/S = Normal (See Report).    j Umbilical Hernia     k Early Stage Bladder Cancer ######    Dr. Louis Ordonez Removed This Polyp 11/2019    k Low Testosterone ###    Dr. Louis Ordonez; 1/17/18 RXd Testosterone 100 Mg Every 2 Weeks; 7/21/17 Increased From 2 Pumps To 3 Pumps Daily; On Topical Testosterone Supplement    l H/O Bilateral TKRs 2006     l Lumbar DDD S/P Sx 2011 With Chronic LBP     Dr. Alejandro parker Bilateral Lower Extremity Diabetic Peripheral Neuropathy     Mainly Mild Numbness In His Feet    m Chronic Fatigue ###    W/O Daytime Drowsiness Or Witnessed Apnea; 7/22/21 TFTs, Vitamin B12, And Testosterone (On Supplementation) = Are Normal    p OU Cataracts     Dr. Blake Mitchell    p OU Hypermetropia     Dr. Blake Mitchell    Wellness Visit 2/26/2020     Wellness Visit 7/19/2021       Past Surgical History:   Procedure Laterality Date    ANGIOGRAM, CORONARY, WITH LEFT HEART CATHETERIZATION N/A 03/25/2021    BACK SURGERY      CIRCUMCISION, PRIMARY      COLONOSCOPY N/A 7/24/2020    Procedure: COLONOSCOPY;  Surgeon: Chema Bray MD;  [facility];  Service: Endoscopy;  Laterality: N/A;    COLONOSCOPY W/ POLYPECTOMY      ESOPHAGEAL DILATION      ESOPHAGOGASTRODUODENOSCOPY      JOINT REPLACEMENT      bilateral knee replacements    nerve block in spine       PENILE PROSTHESIS IMPLANT      1/2021 REKHA BASS    penis surgery (venous ligation)      Old treatmt for erect dys - (ligation of the dorsal vein of the penis, operation not currently practiced)    SPINE SURGERY      lumbar fusion    TOTAL " KNEE ARTHROPLASTY      Bilateral      Family History   Problem Relation Age of Onset    Hypertension Mother     Stroke Mother     Cancer Mother         lung (started smoking age 13)    Cancer Father         lung (started smoking age 8)    Heart attack Brother         massive heart attack at age 34      Wt Readings from Last 10 Encounters:   01/19/22 77.5 kg (170 lb 13.7 oz)   01/13/22 77.6 kg (171 lb 1.2 oz)   01/01/22 77.6 kg (171 lb 1.2 oz)   08/24/21 82.9 kg (182 lb 12.2 oz)   07/19/21 82 kg (180 lb 11.2 oz)   04/20/21 81.6 kg (180 lb)   03/29/21 80.3 kg (177 lb)   01/06/21 82.7 kg (182 lb 6.4 oz)   09/30/20 80.3 kg (177 lb 0.5 oz)   08/26/20 81.2 kg (179 lb)     Lab Results   Component Value Date    WBC 7.06 01/04/2022    HGB 12.2 (L) 01/04/2022    HCT 37.6 (L) 01/04/2022    MCV 88 01/04/2022     01/04/2022     CMP  Sodium   Date Value Ref Range Status   01/04/2022 140 136 - 145 mmol/L Final   08/01/2015 139 137 - 145 MMOL/L Final     Potassium   Date Value Ref Range Status   01/04/2022 4.3 3.5 - 5.1 mmol/L Final   08/01/2015 4.6 3.5 - 5.1 MMOL/L Final     Chloride   Date Value Ref Range Status   01/04/2022 106 95 - 110 mmol/L Final   08/01/2015 101 98 - 107 MMOL/L Final     CO2   Date Value Ref Range Status   01/04/2022 23 22 - 31 mmol/L Final     Glucose   Date Value Ref Range Status   01/04/2022 157 (H) 70 - 110 mg/dL Final     Comment:     The ADA recommends the following guidelines for fasting glucose:    Normal:       less than 100 mg/dL    Prediabetes:  100 mg/dL to 125 mg/dL    Diabetes:     126 mg/dL or higher       BUN   Date Value Ref Range Status   01/04/2022 14 9 - 21 mg/dL Final     Creatinine   Date Value Ref Range Status   01/04/2022 0.90 0.50 - 1.40 mg/dL Final   08/01/2015 1.09 0.66 - 1.25 MG/DL Final     Calcium   Date Value Ref Range Status   01/04/2022 8.8 8.4 - 10.2 mg/dL Final     Total Protein   Date Value Ref Range Status   01/02/2022 7.1 6.0 - 8.4 g/dL Final     Albumin    Date Value Ref Range Status   01/02/2022 4.0 3.5 - 5.2 g/dL Final   08/01/2015 4.2 3.5 - 5.0 G/DL Final     Total Bilirubin   Date Value Ref Range Status   01/02/2022 0.8 0.2 - 1.3 mg/dL Final     Alkaline Phosphatase   Date Value Ref Range Status   01/02/2022 69 38 - 145 U/L Final     AST   Date Value Ref Range Status   01/02/2022 25 17 - 59 U/L Final     ALT   Date Value Ref Range Status   01/02/2022 39 0 - 50 U/L Final     Anion Gap   Date Value Ref Range Status   01/04/2022 11 8 - 16 mmol/L Final     eGFR if    Date Value Ref Range Status   01/04/2022 >60 >60 mL/min/1.73 m^2 Final     eGFR if non    Date Value Ref Range Status   01/04/2022 >60 >60 mL/min/1.73 m^2 Final     Comment:     Calculation used to obtain the estimated glomerular filtration  rate (eGFR) is the CKD-EPI equation.        Lab Results   Component Value Date    IRON 82 04/20/2021    TIBC 427 04/20/2021    FERRITIN 12 (L) 04/20/2021          Reviewed prior medical records including radiology report of CT of abdomen and pelvis 1/1/22 & endoscopy history (see surgical history).     Objective:      Physical Exam  Constitutional:       General: He is not in acute distress.Vital signs are normal.      Appearance: He is well-developed and well-nourished. He is not sickly-appearing.   HENT:      Head: Normocephalic.      Right Ear: Hearing normal.      Left Ear: Hearing normal.      Nose: Nose normal.      Mouth/Throat:      Mouth: Oropharynx is clear and moist and mucous membranes are normal.      Comments: Pt wearing mask due to COVID concerns  Eyes:      General: Lids are normal.      Conjunctiva/sclera: Conjunctivae normal.      Pupils: Pupils are equal, round, and reactive to light.   Neck:      Trachea: Trachea normal.   Cardiovascular:      Rate and Rhythm: Normal rate and regular rhythm.      Heart sounds: Normal heart sounds. No murmur heard.      Pulmonary:      Effort: Pulmonary effort is normal. No  respiratory distress.      Breath sounds: Normal breath sounds. No stridor. No wheezing.   Abdominal:      General: Bowel sounds are normal. There is no distension or ascites.      Palpations: Abdomen is soft. There is no mass.      Tenderness: There is no abdominal tenderness. There is no guarding or rebound.      Hernia: A hernia (non tender and reducible) is present. Hernia is present in the umbilical area.   Musculoskeletal:         General: Normal range of motion.      Cervical back: Normal range of motion.   Skin:     General: Skin is warm, dry and intact.      Findings: No rash.      Comments: Non jaundiced   Neurological:      Mental Status: He is alert and oriented to person, place, and time.   Psychiatric:         Mood and Affect: Mood and affect normal.         Speech: Speech normal.         Behavior: Behavior normal. Behavior is cooperative.           Assessment:       1. Small bowel perforation    2. Iron deficiency anemia, unspecified iron deficiency anemia type    3. Weight loss    4. History of gastroesophageal reflux (GERD)    5. History of colon polyps           Plan:   Discussed case with Dr. Bray.     All diagnoses and orders for this visit:    Small bowel perforation  - CT Enterography Abd_Pelvis With Contrast; Future; Expected date: 01/19/2022  - Consider VCE if CT Enterography unremarkable    Iron deficiency anemia, unspecified iron deficiency anemia type  - Discussed with patient the different ways that anemia occurs: blood loss (such as from the gi tract), the body is not making enough, or the body is breaking down the rbcs too quickly; recommend EGD to further evaluate gi tract for possible blood loss and pending results of endoscopy, possible video capsule study  - Continue OTC oral iron supplement daily  - Follow-up with PCP and/or hematology for continued evaluation and management    Weight loss   - Schedule EGD   - Encouraged PO intake and daily calorie counts to ensure adequate  nutrition is taken in, recommend at least 1,800-2,000 calories a day   - Recommend nutritional drinks, such as Boost, Ensure or Glucerna, to supplement nutrition needs    History of gastroesophageal reflux (GERD)   - Continue Omeprazole 40 mg once daily   - Take PPI in the morning 30 minutes before breakfast   - Recommend to avoid large meals, avoid eating within 3 hours of bedtime, elevate head of bed if nocturnal symptoms are present, smoking cessation (if current smoker), & weight loss (if overweight).    - Recommend minimize/avoid high-fat foods, chocolate, caffeine, citrus, alcohol, & tomato products.   - Advised to avoid/limit use of NSAID's, since they can cause GI upset, bleeding, and/or ulcers. If needed, take with food.     History of colon polyps   - Next surveillance colonoscopy due 7/2025    If no improvement in symptoms or symptoms worsen, call/follow-up at clinic or go to ER

## 2022-01-19 NOTE — TELEPHONE ENCOUNTER
Spoke to the pt and he wanted to make his yearly cysto for hx of bladder cancer with Dr Philippe.  At the same time he will need a yearly exam, this was a former pt of Dr Hernández.

## 2022-01-19 NOTE — PATIENT INSTRUCTIONS
Patient Education       Perforation of the GI Tract   About this topic   A perforation is another name for a hole. Your GI tract is also called your gastrointestinal tract. This tract includes your food pipe or esophagus, stomach, and bowels. When you have a hole in your GI tract, the food, digestive juices, and intestinal contents can leak out. These fluids can be very irritating and have bacteria in them. If this condition is not treated, you will have severe belly pain and develop a very bad infection. This is a serious problem that can even cause death.  What are the causes?   Many things can cause a hole in your GI tract. Some of them are:  · Swallowing a foreign object  · Drugs  · Procedures like colonoscopy or a surgery  · Diseases like Crohn, ulcerative colitis, Hirschsprung, ulcers, appendicitis, diverticulitis  · Cancer of your GI tract  · Infections  · Injuries from car accidents or other types of trauma  What are the main signs?   · Sudden, very bad belly pain that may move to the shoulder or pelvic area  · Belly is swollen and bloated, firm, and hurts when you touch it  · Upset stomach and throwing up  · Fever and chills  · Sweating  · Blood in your stool  · Fast heart rate  · Low blood pressure  · Confusion  How does the doctor diagnose this health problem?   Your doctor will ask you about your health history. Talk to the doctor about:  · All the drugs you are taking. Be sure to include all prescription, over the counter, vitamins and herbal supplements. Bring a list of drugs you take with you.  · Tell the doctor if you have any drug allergy.  · Any bleeding problems. Be sure to tell your doctor if you are taking any drugs that may cause bleeding. Some of these are warfarin, rivaroxaban, apixaban, ticagrelor, clopidogrel, ketorolac, ibuprofen, naproxen, or aspirin. Certain vitamins and herbs, such as garlic and fish oil, may also add to the risk for bleeding. You may need to stop these drugs as well.  Talk to your doctor about them.  · Any prior surgeries or procedures to your GI tract.  Your doctor will do a physical exam and may order:  · Lab tests  · X-rays  · CT scan  · Endoscopy ? A camera placed down your throat or through your rectum to look for holes in your GI tract  · A small tube to be placed through the nose into the stomach to drain out air and stomach contents  · A small tube to be placed in your bladder to monitor your urine  How does the doctor treat this health problem?   You will need to stop eating and take antibiotics through an IV.  To diagnose or treat a hole in the GI tract, you may need to have surgery. This also gives your doctor a chance to clean out anything that has spilled out of your GI tract and could cause swelling or infection. Your care will be based on where the hole is in your GI tract. The surgery is done as an open procedure, with a larger cut, or laparoscopically with a few small cuts. The doctor will talk to you about the best way to do your surgery. Your doctor may need to take out some of your bowel. You may also need to have an ostomy. This is an opening from the bowel to the outside of the body to let you get rid of your body's waste products. The ostomy may be needed for a short time to help your bowel to heal or may be needed forever.  You may not be able to eat after surgery for a long time. If so, you may need to be fed through a small tube in your nose that goes into your stomach. Doctors may also use a tube that goes through your skin, directly into your stomach or small intestine, or an IV to give your GI tract time to heal.  What drugs may be needed?   The doctor may order drugs to:  · Help with pain  · Fight an infection  · Lower fever  What problems could happen?   · Bleeding  · Infection  · Pain  · Blockage in your GI tract  · Symptoms could happen again  What can be done to prevent this health problem?   · Follow your doctor's orders if you have a condition  like Crohn's, colitis, Hirschsprung's, or ulcers.  · Stop smoking.  · Limit alcohol.  · Talk to your doctor if you take steroids or drugs like ibuprofen (Advil, Motrin), aspirin, or naproxen which are NSAIDs (non-steroidal anti-inflammatory drugs). These can raise the risk of GI bleeding. Ask if there are other drugs you can take.  Last Reviewed Date   2021-07-15  Consumer Information Use and Disclaimer   This information is not specific medical advice and does not replace information you receive from your health care provider. This is only a brief summary of general information. It does NOT include all information about conditions, illnesses, injuries, tests, procedures, treatments, therapies, discharge instructions or life-style choices that may apply to you. You must talk with your health care provider for complete information about your health and treatment options. This information should not be used to decide whether or not to accept your health care providers advice, instructions or recommendations. Only your health care provider has the knowledge and training to provide advice that is right for you.  Copyright   Copyright © 2021 [employer] and its affiliates and/or licensors. All rights reserved.

## 2022-01-21 PROBLEM — R19.4 CHANGE IN BOWEL HABITS: Status: RESOLVED | Noted: 2019-06-26 | Resolved: 2022-01-21

## 2022-01-21 PROBLEM — E11.49 TYPE 2 DIABETES MELLITUS WITH NEUROLOGIC COMPLICATION: Status: ACTIVE | Noted: 2022-01-21

## 2022-01-24 ENCOUNTER — TELEPHONE (OUTPATIENT)
Dept: GASTROENTEROLOGY | Facility: CLINIC | Age: 67
End: 2022-01-24
Payer: MEDICARE

## 2022-01-24 NOTE — TELEPHONE ENCOUNTER
Patient was returning a call to schedule VCE. Scheduled pt and went over prep instructions. Pt verbalized understanding to all

## 2022-01-24 NOTE — TELEPHONE ENCOUNTER
----- Message from Jaqueline Fisher LPN sent at 1/24/2022 11:29 AM CST -----  Regarding: FW: appt/call back    ----- Message -----  From: Valerie Guerin  Sent: 1/24/2022  11:28 AM CST  To: Asa CASTREJON Staff  Subject: appt/call back                                   Type:  Patient Returning Call    Who Called:  pt  Who Left Message for Patient:  James  Does the patient know what this is regarding?:  appt  Best Call Back Number:   Additional Information:  pt called back

## 2022-01-24 NOTE — TELEPHONE ENCOUNTER
"Returned call to pt. Pt stated he was reading the instructions for the VCE and wanted to know if "I need to turn it into [place] aswell or can I do it in [place]". Advised pt it was all in [place] Pt verbalized understanding   "

## 2022-01-24 NOTE — TELEPHONE ENCOUNTER
----- Message from Sarah Weber sent at 1/24/2022  3:53 PM CST -----  Contact: Patient  Type: Needs Medical Advice    Who Called:  Patient    Best Call Back Number: [phone]    Additional Information: Patient has questions about his endoscopy scheduled on 2/14, is asking to speak to James.  Please call back.  Thanks.

## 2022-01-24 NOTE — TELEPHONE ENCOUNTER
----- Message from Perla Wright NP sent at 1/24/2022  9:29 AM CST -----  Let patient know his CT enterography showed resolution of small bowel thickening, right kidney cyst, and diverticulosis without evidence of diverticulitis. Recommend a video capsule endoscopy to further evaluate small bowel (discussed with patient at last office visit); please schedule if patient agreeable.

## 2022-02-03 ENCOUNTER — ANESTHESIA EVENT (OUTPATIENT)
Dept: ENDOSCOPY | Facility: HOSPITAL | Age: 67
End: 2022-02-03
Payer: MEDICARE

## 2022-02-03 ENCOUNTER — ANESTHESIA (OUTPATIENT)
Dept: ENDOSCOPY | Facility: HOSPITAL | Age: 67
End: 2022-02-03
Payer: MEDICARE

## 2022-02-03 ENCOUNTER — HOSPITAL ENCOUNTER (OUTPATIENT)
Facility: HOSPITAL | Age: 67
Discharge: HOME | End: 2022-02-03
Attending: INTERNAL MEDICINE | Admitting: INTERNAL MEDICINE
Payer: MEDICARE

## 2022-02-03 DIAGNOSIS — D50.9 IDA (IRON DEFICIENCY ANEMIA): ICD-10-CM

## 2022-02-03 LAB
CTP QC/QA: YES
SARS-COV-2 AG RESP QL IA.RAPID: NEGATIVE

## 2022-02-03 PROCEDURE — 88305 TISSUE EXAM BY PATHOLOGIST: CPT | Mod: 26,,, | Performed by: PATHOLOGY

## 2022-02-03 PROCEDURE — D9220A PRA ANESTHESIA: ICD-10-PCS | Mod: CRNA,,, | Performed by: NURSE ANESTHETIST, CERTIFIED REGISTERED

## 2022-02-03 PROCEDURE — D9220A PRA ANESTHESIA: Mod: ANES,,, | Performed by: ANESTHESIOLOGY

## 2022-02-03 PROCEDURE — 88305 TISSUE EXAM BY PATHOLOGIST: ICD-10-PCS | Mod: 26,,, | Performed by: PATHOLOGY

## 2022-02-03 PROCEDURE — D9220A PRA ANESTHESIA: ICD-10-PCS | Mod: ANES,,, | Performed by: ANESTHESIOLOGY

## 2022-02-03 PROCEDURE — 25000003 PHARM REV CODE 250: Mod: PO | Performed by: NURSE ANESTHETIST, CERTIFIED REGISTERED

## 2022-02-03 PROCEDURE — 43239 EGD BIOPSY SINGLE/MULTIPLE: CPT | Mod: PO | Performed by: INTERNAL MEDICINE

## 2022-02-03 PROCEDURE — 43239 PR EGD, FLEX, W/BIOPSY, SGL/MULTI: ICD-10-PCS | Mod: ,,, | Performed by: INTERNAL MEDICINE

## 2022-02-03 PROCEDURE — 63600175 PHARM REV CODE 636 W HCPCS: Mod: PO | Performed by: NURSE ANESTHETIST, CERTIFIED REGISTERED

## 2022-02-03 PROCEDURE — 88305 TISSUE EXAM BY PATHOLOGIST: CPT | Mod: 59 | Performed by: PATHOLOGY

## 2022-02-03 PROCEDURE — 27201012 HC FORCEPS, HOT/COLD, DISP: Mod: PO | Performed by: INTERNAL MEDICINE

## 2022-02-03 PROCEDURE — 37000008 HC ANESTHESIA 1ST 15 MINUTES: Mod: PO | Performed by: INTERNAL MEDICINE

## 2022-02-03 PROCEDURE — D9220A PRA ANESTHESIA: Mod: CRNA,,, | Performed by: NURSE ANESTHETIST, CERTIFIED REGISTERED

## 2022-02-03 PROCEDURE — 63600175 PHARM REV CODE 636 W HCPCS: Mod: PO | Performed by: INTERNAL MEDICINE

## 2022-02-03 PROCEDURE — 43239 EGD BIOPSY SINGLE/MULTIPLE: CPT | Mod: ,,, | Performed by: INTERNAL MEDICINE

## 2022-02-03 RX ORDER — SODIUM CHLORIDE, SODIUM LACTATE, POTASSIUM CHLORIDE, CALCIUM CHLORIDE 600; 310; 30; 20 MG/100ML; MG/100ML; MG/100ML; MG/100ML
INJECTION, SOLUTION INTRAVENOUS CONTINUOUS
Status: DISCONTINUED | OUTPATIENT
Start: 2022-02-03 | End: 2022-02-03 | Stop reason: HOSPADM

## 2022-02-03 RX ORDER — SODIUM CHLORIDE 0.9 % (FLUSH) 0.9 %
10 SYRINGE (ML) INJECTION
Status: DISCONTINUED | OUTPATIENT
Start: 2022-02-03 | End: 2022-02-03 | Stop reason: HOSPADM

## 2022-02-03 RX ORDER — LIDOCAINE HCL/PF 100 MG/5ML
SYRINGE (ML) INTRAVENOUS
Status: DISCONTINUED | OUTPATIENT
Start: 2022-02-03 | End: 2022-02-03

## 2022-02-03 RX ORDER — PROPOFOL 10 MG/ML
VIAL (ML) INTRAVENOUS
Status: DISCONTINUED | OUTPATIENT
Start: 2022-02-03 | End: 2022-02-03

## 2022-02-03 RX ADMIN — LIDOCAINE HYDROCHLORIDE 100 MG: 20 INJECTION, SOLUTION INTRAVENOUS at 12:02

## 2022-02-03 RX ADMIN — SODIUM CHLORIDE, SODIUM LACTATE, POTASSIUM CHLORIDE, AND CALCIUM CHLORIDE: .6; .31; .03; .02 INJECTION, SOLUTION INTRAVENOUS at 12:02

## 2022-02-03 RX ADMIN — PROPOFOL 50 MG: 10 INJECTION, EMULSION INTRAVENOUS at 12:02

## 2022-02-03 RX ADMIN — PROPOFOL 25 MG: 10 INJECTION, EMULSION INTRAVENOUS at 12:02

## 2022-02-03 RX ADMIN — PROPOFOL 150 MG: 10 INJECTION, EMULSION INTRAVENOUS at 12:02

## 2022-02-03 NOTE — ANESTHESIA PREPROCEDURE EVALUATION
02/03/2022  Hima Mensah is a 66 y.o., male.    Pre-op Assessment    I have reviewed the Patient Summary Reports.     I have reviewed the Nursing Notes.       Review of Systems  Anesthesia Hx:  No problems with previous Anesthesia    Cardiovascular:   Hypertension    Hepatic/GI:   GERD    Musculoskeletal:   Arthritis     Neurological:   Neuromuscular Disease,    Endocrine:   Diabetes        Physical Exam  General:  Well nourished    Airway/Jaw/Neck:  Airway Findings: Mouth Opening: Normal Tongue: Normal  General Airway Assessment: Adult  TM Distance: Normal, at least 6 cm  Jaw/Neck Findings:  Neck ROM: Normal ROM     Eyes/Ears/Nose:  Eyes/Ears/Nose Findings:    Dental:  Dental Findings:   Chest/Lungs:  Chest/Lungs Findings: Normal Respiratory Rate     Heart/Vascular:  Heart Findings: Rate: Normal  Rhythm: Regular Rhythm        Mental Status:  Mental Status Findings:  Cooperative, Alert and Oriented         Anesthesia Plan  Type of Anesthesia, risks & benefits discussed:  Anesthesia Type:  general    Patient's Preference: General  Plan Factors:          Intra-op Monitoring Plan: standard ASA monitors  Intra-op Monitoring Plan Comments:   Post Op Pain Control Plan:   Post Op Pain Control Plan Comments:     Induction:   IV  Beta Blocker:  Patient is not currently on a Beta-Blocker (No further documentation required).       Informed Consent: Patient understands risks and agrees with Anesthesia plan.  Questions answered. Anesthesia consent signed with patient.  ASA Score: 3     Day of Surgery Review of History & Physical:    H&P update referred to the surgeon.         Ready For Surgery From Anesthesia Perspective.

## 2022-02-03 NOTE — PROVATION PATIENT INSTRUCTIONS
Discharge Summary/Instructions after an Endoscopic Procedure  Patient Name: Hima Mensah  Patient MRN: [id number]  Patient YOB: 1955  Thursday, February 3, 2022  Chema Bray MD  Dear patient,  As a result of recent federal legislation (The Federal Cures Act), you may   receive lab or pathology results from your procedure in your [facility]   account before your physician is able to contact you. Your physician or   their representative will relay the results to you with their   recommendations at their soonest availability.  Thank you,  RESTRICTIONS:  During your procedure today, you received medications for sedation.  These   medications may affect your judgment, balance and coordination.  Therefore,   for 24 hours, you have the following restrictions:   - DO NOT drive a car, operate machinery, make legal/financial decisions,   sign important papers or drink alcohol.    ACTIVITY:  Today: no heavy lifting, straining or running due to procedural   sedation/anesthesia.  The following day: return to full activity including work.  DIET:  Eat and drink normally unless instructed otherwise.     TREATMENT FOR COMMON SIDE EFFECTS:  - Mild abdominal pain, nausea, belching, bloating or excessive gas:  rest,   eat lightly and use a heating pad.  - Sore Throat: treat with throat lozenges and/or gargle with warm salt   water.  - Because air was used during the procedure, expelling large amounts of air   from your rectum or belching is normal.  - If a bowel prep was taken, you may not have a bowel movement for 1-3 days.    This is normal.  SYMPTOMS TO WATCH FOR AND REPORT TO YOUR PHYSICIAN:  1. Abdominal pain or bloating, other than gas cramps.  2. Chest pain.  3. Back pain.  4. Signs of infection such as: chills or fever occurring within 24 hours   after the procedure.  5. Rectal bleeding, which would show as bright red, maroon, or black stools.   (A tablespoon of blood from the rectum is not serious, especially  if   hemorrhoids are present.)  6. Vomiting.  7. Weakness or dizziness.  GO DIRECTLY TO THE NEAREST EMERGENCY ROOM IF YOU HAVE ANY OF THE FOLLOWING:      Difficulty breathing              Chills and/or fever over 101 F   Persistent vomiting and/or vomiting blood   Severe abdominal pain   Severe chest pain   Black, tarry stools   Bleeding- more than one tablespoon   Any other symptom or condition that you feel may need urgent attention  Your doctor recommends these additional instructions:  If any biopsies were taken, your doctors clinic will contact you in 1 to 2   weeks with any results.  We are waiting for your pathology results.   Continue your present medications.   You are being discharged to home.  For questions, problems or results please call your physician - Chema Bray MD at Work:  [phone].  EMERGENCY PHONE NUMBER: [phone], LAB RESULTS: [phone]  IF A COMPLICATION OR EMERGENCY SITUATION ARISES AND YOU ARE UNABLE TO REACH   YOUR PHYSICIAN - GO DIRECTLY TO THE EMERGENCY ROOM.  ___________________________________________  Nurse Signature  ___________________________________________  Patient/Designated Responsible Party Signature  Chema Bray MD  2/3/2022 12:37:22 PM  This report has been verified and signed electronically.  Dear patient,  As a result of recent federal legislation (The Federal Cures Act), you may   receive lab or pathology results from your procedure in your [facility]   account before your physician is able to contact you. Your physician or   their representative will relay the results to you with their   recommendations at their soonest availability.  Thank you.  PROVATION

## 2022-02-03 NOTE — DISCHARGE SUMMARY
[facility] - Endoscopy  Discharge Note  Short Stay    Discharge Note  Short Stay      SUMMARY     Admit Date: 2/3/2022    Attending Physician: Chema Bray MD     Discharge Physician: Chema Bray MD    Discharge Date: 2/3/2022 12:38 PM    Final Diagnosis: Iron deficiency anemia, unspecified iron deficiency anemia type [D50.9]  Small bowel perforation [K63.1]  History of gastroesophageal reflux (GERD) [Z87.19]    Disposition: HOME OR SELF CARE    Patient Instructions:   Current Discharge Medication List      CONTINUE these medications which have NOT CHANGED    Details   amLODIPine (NORVASC) 10 MG tablet Take 1 tablet (10 mg total) by mouth every evening.  Qty: 90 tablet, Refills: 3    Comments: .      ascorbic acid, vitamin C, (VITAMIN C) 1000 MG tablet Take 1,000 mg by mouth once daily.      aspirin (ECOTRIN) 81 MG EC tablet Take 1 tablet (81 mg total) by mouth once daily.  Refills: 0      ezetimibe (ZETIA) 10 mg tablet Take 1 tablet (10 mg total) by mouth once daily.  Qty: 90 tablet, Refills: 3      fexofenadine (ALLEGRA) 180 MG tablet Take 180 mg by mouth once daily.      gabapentin (NEURONTIN) 300 MG capsule TAKE 1 TO 2 CAPSULES(300  MG) BY MOUTH EVERY EVENING  Qty: 180 capsule, Refills: 3      ibuprofen (ADVIL,MOTRIN) 800 MG tablet Take 1 tablet (800 mg total) by mouth 2 (two) times daily as needed for Pain.  Qty: 180 tablet, Refills: 3      iron/vitamin B complex (GERITOL ORAL) Take 1 capsule by mouth every evening.      JARDIANCE 10 mg tablet Take 10 mg by mouth once daily.      losartan (COZAAR) 100 MG tablet TAKE 1 1/2 TABLET(150 MG) BY MOUTH EVERY MORNING  Qty: 135 tablet, Refills: 3    Comments: .  Associated Diagnoses: Hypertension, unspecified type      metFORMIN (GLUCOPHAGE) 500 MG tablet Take 2 tablets (1,000 mg total) by mouth 2 (two) times daily with meals.  Qty: 360 tablet, Refills: 3      metoprolol succinate (TOPROL-XL) 25 MG 24 hr tablet Take 1 tablet (25 mg total) by mouth  once daily.  Qty: 90 tablet, Refills: 3    Comments: .      omeprazole (PRILOSEC) 40 MG capsule Take 1 capsule (40 mg total) by mouth once daily.  Qty: 90 capsule, Refills: 3      tamsulosin (FLOMAX) 0.4 mg Cap TAKE 2 CAPSULES(0.8 MG) BY MOUTH EVERY DAY  Qty: 60 capsule, Refills: 11      vitamin E 400 UNIT capsule Take 2 capsules (800 Units total) by mouth once daily.      zinc gluconate 50 mg tablet Take 50 mg by mouth once daily.      blood sugar diagnostic Strp 1 strip by Misc.(Non-Drug; Combo Route) route before breakfast. This is for One Tough Ultra test strips  Qty: 100 strip, Refills: 3      dextromethorphan-guaiFENesin  mg/5 ml (ROBITUSSIN-DM)  mg/5 mL liquid Take 5 mLs by mouth every 6 (six) hours as needed (cough/congestion).      DM/pseudoephed/acetaminophen (VICKS DAYQUIL ORAL) Take 2 capsules by mouth every 6 (six) hours as needed (congestion).      dutasteride (AVODART) 0.5 mg capsule Take 1 capsule (0.5 mg total) by mouth once daily.  Qty: 90 capsule, Refills: 3    Associated Diagnoses: Benign prostatic hyperplasia with weak urinary stream      lancets Misc 1 lancet by Misc.(Non-Drug; Combo Route) route before breakfast. This is for One Touch Delica lancets  Qty: 100 each, Refills: 3      nitroGLYCERIN (NITROSTAT) 0.4 MG SL tablet Place 1 tablet under the tongue every 5 minutes if needed for chest pain; call 911 for chest pain not relieved after 3 tablets  Qty: 100 tablet, Refills: 3      testosterone cypionate (DEPOTESTOTERONE CYPIONATE) 100 mg/mL injection INJECT 1ML IN THE MUSCLE EVERY 14 DAYS  Qty: 10 mL, Refills: 1             Discharge Procedure Orders (must include Diet, Follow-up, Activity)    Follow Up:  Follow up with PCP as previously scheduled  Resume routine diet.  Activity as tolerated.    No driving day of procedure.

## 2022-02-03 NOTE — PLAN OF CARE
Has met unit/department guidelines for discharge from each phase of the post procedure continuum. Patient discharged.  Instructions and Prescriptions given.  IV removed, tolerated well.  Patient verbalized understanding instructions.  AAOx3, VSS, NADN, no complaints noted at this time.  Wheelchair to private vehicle in care of family.

## 2022-02-03 NOTE — TRANSFER OF CARE
"Anesthesia Transfer of Care Note    Patient: Hima Mensah    Procedure(s) Performed: Procedure(s) (LRB):  EGD (ESOPHAGOGASTRODUODENOSCOPY) (N/A)    Patient location: PACU    Anesthesia Type: general    Transport from OR: Transported from OR on room air with adequate spontaneous ventilation    Post pain: adequate analgesia    Post assessment: no apparent anesthetic complications and tolerated procedure well    Post vital signs: stable    Level of consciousness: sedated and awake    Nausea/Vomiting: no nausea/vomiting    Complications: none    Transfer of care protocol was followed      Last vitals:   Visit Vitals  BP (!) 149/80   Pulse 63   Temp 36.8 °C (98.2 °F) (Skin)   Resp 18   Ht 5' 7" (1.702 m)   Wt 74.8 kg (165 lb)   SpO2 98%   BMI 25.84 kg/m²     "

## 2022-02-03 NOTE — DISCHARGE INSTRUCTIONS
Patient Education       Upper GI Endoscopy Discharge Instructions   About this topic   This procedure is done to view your upper gastrointestinal (GI) tract. This includes your throat and food pipe (esophagus). It also includes your stomach and the first part of the small bowel. Some people have this test for problems like coughing or throwing up blood. Other people may be having bad belly pain or blood in their stool. You may be having trouble swallowing or problems with acid reflux.  Doctors often use this test to look for problems like:  · Ulcers  · Cancer or tumor growths  · Internal bleeding  · Swelling  · Inflammation  · Infection  · Garza's esophagus  · Gastroesophageal reflux disease or GERD  · Swallowing problems     What care is needed at home?   · Ask your doctor what you need to do when you go home. Make sure you ask questions if you do not understand what the doctor says. This way you will know what you need to do.  · Your throat may feel sore. Your belly may also feel bloated. Your doctor may give you drugs to help with pain.  · Talk to your doctor about when it is safe for you to go back to eating your normal diet and taking your drugs. You can drink fluid once the numbing drugs in your throat wear off.  What follow-up care is needed?   · Your doctor may ask you to make visits to the office to check on your progress. Be sure to keep these visits.  · The results of this test may help your doctor understand what kind of problem you have with your upper GI tract. Together you can make a plan for more care.  What drugs may be needed?   The doctor may order drugs to:  · Help with pain  · Decrease the acid in your stomach  Will physical activity be limited?   You may need to limit your activity for the rest of the day. After that, you will likely be able to go back to your normal activities.  What changes to diet are needed?   Soft foods like pudding or soups may be easier to eat at first. Ask your doctor  if you need to make any changes to your diet.  What problems could happen?   · Painful swallowing  · Upset stomach  · Injury to food pipe   · Throwing up  · Tear in the esophagus  When do I need to call the doctor?   · Signs of infection. These include a fever of 100.4°F (38°C) or higher, chills.  · Very bad belly pain  · Throwing up blood  · Your belly is hard and swollen  · Trouble swallowing or breathing  · Upset stomach and throwing up  · Bloody or black tarry stools  · Cough, shortness of breath, or chest pain  · A crunching feeling under the skin in your neck  · You are not feeling better in 2 to 3 days or you are feeling worse  Teach Back: Helping You Understand   The Teach Back Method helps you understand the information we are giving you. After you talk with the staff, tell them in your own words what you learned. This helps to make sure the staff has described each thing clearly. It also helps to explain things that may have been confusing. Before going home, make sure you can do these:  · I can tell you about my procedure.  · I can tell you what changes I need to make with my diet or drugs.  · I can tell you what I will do if I have very bad belly pain, throwing up blood, or my belly is hard and swollen.  Where can I learn more?   [employer]  https://www.gastro.org/practice-guidance/gi-patient-center/topic/upper-gi-endoscopy   Last Reviewed Date   2021-10-05  Consumer Information Use and Disclaimer   This information is not specific medical advice and does not replace information you receive from your health care provider. This is only a brief summary of general information. It does NOT include all information about conditions, illnesses, injuries, tests, procedures, treatments, therapies, discharge instructions or life-style choices that may apply to you. You must talk with your health care provider for complete information about your health and treatment options. This  information should not be used to decide whether or not to accept your health care providers advice, instructions or recommendations. Only your health care provider has the knowledge and training to provide advice that is right for you.  Copyright   Copyright © 2021 [employer] and its affiliates and/or licensors. All rights reserve

## 2022-02-03 NOTE — H&P
History & Physical - Short Stay  Gastroenterology      SUBJECTIVE:     Procedure: EGD    Chief Complaint/Indication for Procedure: Iron Deficiency Anemia    PTA Medications   Medication Sig    amLODIPine (NORVASC) 10 MG tablet Take 1 tablet (10 mg total) by mouth every evening.    ascorbic acid, vitamin C, (VITAMIN C) 1000 MG tablet Take 1,000 mg by mouth once daily.    aspirin (ECOTRIN) 81 MG EC tablet Take 1 tablet (81 mg total) by mouth once daily. (Patient taking differently: Take 81 mg by mouth every evening.)    ezetimibe (ZETIA) 10 mg tablet Take 1 tablet (10 mg total) by mouth once daily. (Patient taking differently: Take 10 mg by mouth every evening.)    fexofenadine (ALLEGRA) 180 MG tablet Take 180 mg by mouth once daily.    gabapentin (NEURONTIN) 300 MG capsule TAKE 1 TO 2 CAPSULES(300  MG) BY MOUTH EVERY EVENING (Patient taking differently: Take 300 mg by mouth every evening.)    ibuprofen (ADVIL,MOTRIN) 800 MG tablet Take 1 tablet (800 mg total) by mouth 2 (two) times daily as needed for Pain. (Patient taking differently: Take 800 mg by mouth every morning.)    iron/vitamin B complex (GERITOL ORAL) Take 1 capsule by mouth every evening.    JARDIANCE 10 mg tablet Take 10 mg by mouth once daily.    losartan (COZAAR) 100 MG tablet TAKE 1 1/2 TABLET(150 MG) BY MOUTH EVERY MORNING (Patient taking differently: Take 150 mg by mouth once daily.)    metFORMIN (GLUCOPHAGE) 500 MG tablet Take 2 tablets (1,000 mg total) by mouth 2 (two) times daily with meals.    metoprolol succinate (TOPROL-XL) 25 MG 24 hr tablet Take 1 tablet (25 mg total) by mouth once daily.    omeprazole (PRILOSEC) 40 MG capsule Take 1 capsule (40 mg total) by mouth once daily.    tamsulosin (FLOMAX) 0.4 mg Cap TAKE 2 CAPSULES(0.8 MG) BY MOUTH EVERY DAY (Patient taking differently: Take 0.8 mg by mouth every evening.)    vitamin E 400 UNIT capsule Take 2 capsules (800 Units total) by mouth once daily.    zinc gluconate 50  mg tablet Take 50 mg by mouth once daily.    blood sugar diagnostic Strp 1 strip by Misc.(Non-Drug; Combo Route) route before breakfast. This is for One Tough Ultra test strips    dextromethorphan-guaiFENesin  mg/5 ml (ROBITUSSIN-DM)  mg/5 mL liquid Take 5 mLs by mouth every 6 (six) hours as needed (cough/congestion).    DM/pseudoephed/acetaminophen (VICKS DAYQUIL ORAL) Take 2 capsules by mouth every 6 (six) hours as needed (congestion).    dutasteride (AVODART) 0.5 mg capsule Take 1 capsule (0.5 mg total) by mouth once daily.    lancets Misc 1 lancet by Misc.(Non-Drug; Combo Route) route before breakfast. This is for One Touch Delica lancets    nitroGLYCERIN (NITROSTAT) 0.4 MG SL tablet Place 1 tablet under the tongue every 5 minutes if needed for chest pain; call 911 for chest pain not relieved after 3 tablets    testosterone cypionate (DEPOTESTOTERONE CYPIONATE) 100 mg/mL injection INJECT 1ML IN THE MUSCLE EVERY 14 DAYS (Patient taking differently: Inject 100 mg into the muscle every 14 (fourteen) days.)       Review of patient's allergies indicates:   Allergen Reactions    Atorvastatin      Elevated liver enzymes    Pravastatin      Elevated liver enzymes    Simvastatin      Elevated liver enzymes    Penicillins Rash     Other reaction(s): Unknown        Past Medical History:   Diagnosis Date    a Abnormal EKG     Dr. Arnoldo Jeter; 04/2021 LHC/Angiogram (Dr. ADEOLA Jeter) = Was Reportedly Normal (Per Patient's Report)    a Cardiac Diastolic Dysfunction     Dr. Arnoldo Jeter    a Family H/O CAD ###    Dr. Arnoldo Jeter; 2/14/20 LCST = Normal (Report Scanned); 8/1/14 GIOVANI (Dr. Willem Swartz) = Normal At Submaximal Heart Rate    a Palpitations     Dr. Arnoldo Jeter; 6/20/16 24-Hr Holter = 2 Brief PSVT Episodes And Rare PACs And PVCs    a PSVT ###    Dr. Arnoldo Jeter Based On Results Of A 6/20/16 24-Hr Holter = 2 Brief PSVT Episodes And Rare PACs And PVCs    b Hypertension     b Microalbuminuria   "   c Hypercholesterolemia With Low HDL     7/21/17 RXd OTC Flaxseed Oil 3K Mg Daily And D/Cd OTC FO 2K Mg Daily; On Zetia 10 Mg Daily; Statins raise His Liver Enzymes    c Mildly Elevated CK Level 7/22/21 ###    d Type 2 Diabetes Mellitus     1/21/22 Referred To [facility]; 2/24/19 Added Amaryl 2 Mg qAM; He Has Had ADA Training But Doesn't Follow It    g Mild Chronic NCNC Anemia     Am Monitoring    i H/O COVID-19 Infection     His 1/1/22 COVID-189 Swab Test = Was Positive    j Chronic Mildly Elevated ALT Levels     7/27/21 RXd OTC Vitamin E 800 IU Daily    j GERD With Esophageal Stenosis     Dr. Mookie Bray    j H/O Adenomatous Colon Polyps On 7/24/20 TC     Dr. Chema Bray: "Repeat TC In 5 Years"    j Hemorrhoids     j Hepatic Steatosis     7/26/21 AA U/S = Normal (See Report).    j Small Intestine Perforation ####    Dr. Mookie Bray; [facility] 1/1/22-1/4/22 Stay For This    j Umbilical Hernia     1/21/22 Referred To Dr. Matthew pereira Early Stage Bladder Cancer #####    Dr. Lousi Ordonez Removed This Polyp 11/2019    k Low Testosterone ###    Dr. Louis Ordonez; 1/17/18 RXd Testosterone 100 Mg Every 2 Weeks; 7/21/17 Increased From 2 Pumps To 3 Pumps Daily; On Topical Testosterone Supplement    l H/O Bilateral TKRs 2006     l Lumbar DDD S/P Sx 2011 With Chronic LBP     Dr. Alejandro Villegas    m Bilateral Lower Extremity Diabetic Peripheral Neuropathy     Mainly Mild Numbness In His Feet    m Chronic Fatigue ###    W/O Daytime Drowsiness Or Witnessed Apnea; 7/22/21 TFTs, Vitamin B12, And Testosterone (On Supplementation) = Are Normal    p OU Cataracts     Dr. Blake Mitchell    p OU Hypermetropia     Dr. Blake Mitchell    Wellness Visit 7/19/2021      Past Surgical History:   Procedure Laterality Date    ANGIOGRAM, CORONARY, WITH LEFT HEART CATHETERIZATION N/A 03/25/2021    BACK SURGERY      CIRCUMCISION, PRIMARY      COLONOSCOPY N/A 7/24/2020    Procedure: COLONOSCOPY;  Surgeon: " Chema Bray MD;  Location: [facility];  Service: Endoscopy;  Laterality: N/A;    COLONOSCOPY W/ POLYPECTOMY      ESOPHAGEAL DILATION      ESOPHAGOGASTRODUODENOSCOPY      JOINT REPLACEMENT      bilateral knee replacements    nerve block in spine       PENILE PROSTHESIS IMPLANT      1/2021 DR CHIKA PRESSLEY, [facility]    penis surgery (venous ligation)      Old treatmt for erect dys - (ligation of the dorsal vein of the penis, operation not currently practiced)    SPINE SURGERY      lumbar fusion    TOTAL KNEE ARTHROPLASTY      Bilateral    UPPER GASTROINTESTINAL ENDOSCOPY  2008    in legacy tab     Family History   Problem Relation Age of Onset    Hypertension Mother     Stroke Mother     Cancer Mother         lung (started smoking age 13)    Cancer Father         lung (started smoking age 8)    Heart attack Brother         massive heart attack at age 34     Social History     Tobacco Use    Smoking status: Never Smoker    Smokeless tobacco: Never Used   Substance Use Topics    Alcohol use: No     Alcohol/week: 0.0 standard drinks    Drug use: Never         OBJECTIVE:     Vital Signs (Most Recent)  Temp: 98.2 °F (36.8 °C) (02/03/22 1154)    Physical Exam:                                                       GENERAL:  Comfortable, in no acute distress.                                 HEENT EXAM:  Nonicteric.  No adenopathy.  Oropharynx is clear.               NECK:  Supple.                                                               LUNGS:  Clear.                                                               CARDIAC:  Regular rate and rhythm.  S1, S2.  No murmur.                      ABDOMEN:  Soft, positive bowel sounds, nontender.  No hepatosplenomegaly or masses.  No rebound or guarding.                                             EXTREMITIES:  No edema.     MENTAL STATUS:  Normal, alert and oriented.      ASSESSMENT/PLAN:     Assessment: Iron Deficiency Anemia    Plan: EGD    Anesthesia  Plan: General    ASA Grade: ASA 2 - Patient with mild systemic disease with no functional limitations    MALLAMPATI SCORE:  I (soft palate, uvula, fauces, and tonsillar pillars visible)

## 2022-02-04 VITALS
HEART RATE: 66 BPM | OXYGEN SATURATION: 94 % | WEIGHT: 165 LBS | RESPIRATION RATE: 16 BRPM | DIASTOLIC BLOOD PRESSURE: 74 MMHG | SYSTOLIC BLOOD PRESSURE: 120 MMHG | BODY MASS INDEX: 25.9 KG/M2 | HEIGHT: 67 IN | TEMPERATURE: 98 F

## 2022-02-04 NOTE — ANESTHESIA POSTPROCEDURE EVALUATION
Anesthesia Post Evaluation    Patient: Hima Mensah    Procedure(s) Performed: Procedure(s) (LRB):  EGD (ESOPHAGOGASTRODUODENOSCOPY) (N/A)    Final Anesthesia Type: general      Patient location during evaluation: PACU  Patient participation: Yes- Able to Participate  Level of consciousness: awake and alert and oriented  Post-procedure vital signs: reviewed and stable  Pain management: adequate  Airway patency: patent    PONV status at discharge: No PONV  Anesthetic complications: no      Cardiovascular status: blood pressure returned to baseline  Respiratory status: unassisted, spontaneous ventilation and room air  Hydration status: euvolemic  Follow-up not needed.          Vitals Value Taken Time   /74 02/03/22 1312   Temp 36.7 °C (98 °F) 02/03/22 1312   Pulse 66 02/03/22 1312   Resp 16 02/03/22 1312   SpO2 94 % 02/03/22 1312         Event Time   Out of Recovery 13:16:00         Pain/Tracy Score: Tracy Score: 10 (2/3/2022  1:12 PM)

## 2022-02-09 LAB
FINAL PATHOLOGIC DIAGNOSIS: NORMAL
Lab: NORMAL

## 2022-02-14 ENCOUNTER — HOSPITAL ENCOUNTER (OUTPATIENT)
Facility: HOSPITAL | Age: 67
Discharge: HOME | End: 2022-02-14
Attending: INTERNAL MEDICINE | Admitting: INTERNAL MEDICINE
Payer: MEDICARE

## 2022-02-14 VITALS
OXYGEN SATURATION: 97 % | HEART RATE: 72 BPM | RESPIRATION RATE: 16 BRPM | TEMPERATURE: 98 F | SYSTOLIC BLOOD PRESSURE: 149 MMHG | DIASTOLIC BLOOD PRESSURE: 75 MMHG

## 2022-02-14 DIAGNOSIS — D50.9 IRON DEFICIENCY ANEMIA: Primary | ICD-10-CM

## 2022-02-14 PROCEDURE — 25000003 PHARM REV CODE 250: Performed by: INTERNAL MEDICINE

## 2022-02-14 PROCEDURE — 91110 GI TRC IMG INTRAL ESOPH-ILE: CPT | Performed by: INTERNAL MEDICINE

## 2022-02-14 RX ORDER — DEXTROMETHORPHAN/PSEUDOEPHED 2.5-7.5/.8
40 DROPS ORAL ONCE
Status: COMPLETED | OUTPATIENT
Start: 2022-02-14 | End: 2022-02-14

## 2022-02-14 RX ADMIN — SIMETHICONE 40 MG: 20 SUSPENSION/ DROPS ORAL at 07:02

## 2022-02-14 NOTE — H&P
"History & Physical - Short Stay  Gastroenterology    SUBJECTIVE:     Procedure: VCE    Chief Complaint/Indication for Procedure: MARIO    No medications prior to admission.       Review of patient's allergies indicates:   Allergen Reactions    Atorvastatin      Elevated liver enzymes    Pravastatin      Elevated liver enzymes    Simvastatin      Elevated liver enzymes    Penicillins Rash     Other reaction(s): Unknown        Past Medical History:   Diagnosis Date    a Abnormal EKG     Dr. Arnoldo Jeter; 04/2021 [facility]/Angiogram (Dr. ADEOLA Jeter) = Was Reportedly Normal (Per Patient's Report)    a Cardiac Diastolic Dysfunction     Dr. Arnoldo Jeter    a Family H/O CAD ###    Dr. Arnoldo Jeter; 2/14/20 LCST = Normal (Report Scanned); 8/1/14 GIOVANI (Dr. Willem Swartz) = Normal At Submaximal Heart Rate    a Palpitations     Dr. Arnoldo Jeter; 6/20/16 24-Hr Holter = 2 Brief PSVT Episodes And Rare PACs And PVCs    a PSVT ###    Dr. Arnoldo Jeter Based On Results Of A 6/20/16 24-Hr Holter = 2 Brief PSVT Episodes And Rare PACs And PVCs    b Hypertension     b Microalbuminuria     c Hypercholesterolemia With Low HDL     7/21/17 RXd OTC Flaxseed Oil 3K Mg Daily And D/Cd OTC FO 2K Mg Daily; On Zetia 10 Mg Daily; Statins raise His Liver Enzymes    c Mildly Elevated CK Level 7/22/21 ###    d Type 2 Diabetes Mellitus     1/21/22 Referred To [facility]; 2/24/19 Added Amaryl 2 Mg qAM; He Has Had ADA Training But Doesn't Follow It    g Mild Chronic NCNC Anemia     Am Monitoring    i H/O COVID-19 Infection     His 1/1/22 COVID-189 Swab Test = Was Positive    j Chronic Mildly Elevated ALT Levels     7/27/21 RXd OTC Vitamin E 800 IU Daily    j GERD With Esophageal Stenosis     Dr. Mookie Bray    j H/O Adenomatous Colon Polyps On 7/24/20 TC     Dr. Chema Bray: "Repeat TC In 5 Years"    j Hemorrhoids     j Hepatic Steatosis     7/26/21 AA U/S = Normal (See Report).    j Small Intestine Perforation ####    Dr. Mookie Bray; [facility] " 1/1/22-1/4/22 Stay For This    j Umbilical Hernia     1/21/22 Referred To Dr. Matthew pereira Early Stage Bladder Cancer #####    Dr. Louis Ordonez Removed This Polyp 11/2019    k Low Testosterone ###    Dr. Louis Ordonez; 1/17/18 RXd Testosterone 100 Mg Every 2 Weeks; 7/21/17 Increased From 2 Pumps To 3 Pumps Daily; On Topical Testosterone Supplement    l H/O Bilateral TKRs 2006     l Lumbar DDD S/P Sx 2011 With Chronic LBP     Dr. Alejandro Villegas    m Bilateral Lower Extremity Diabetic Peripheral Neuropathy     Mainly Mild Numbness In His Feet    m Chronic Fatigue ###    W/O Daytime Drowsiness Or Witnessed Apnea; 7/22/21 TFTs, Vitamin B12, And Testosterone (On Supplementation) = Are Normal    p OU Cataracts     Dr. Blake Mitchell    p OU Hypermetropia     Dr. Blake Mitchell    Wellness Visit 7/19/2021      Past Surgical History:   Procedure Laterality Date    ANGIOGRAM, CORONARY, WITH LEFT HEART CATHETERIZATION N/A 03/25/2021    BACK SURGERY      CIRCUMCISION, PRIMARY      COLONOSCOPY N/A 7/24/2020    Procedure: COLONOSCOPY;  Surgeon: Chema Bray MD;  Location: [facility];  Service: Endoscopy;  Laterality: N/A;    COLONOSCOPY W/ POLYPECTOMY      ESOPHAGEAL DILATION      ESOPHAGOGASTRODUODENOSCOPY      ESOPHAGOGASTRODUODENOSCOPY N/A 2/3/2022    Procedure: EGD (ESOPHAGOGASTRODUODENOSCOPY);  Surgeon: Chema Bray MD;  Location: [facility];  Service: Endoscopy;  Laterality: N/A;    JOINT REPLACEMENT      bilateral knee replacements    nerve block in spine       PENILE PROSTHESIS IMPLANT      1/2021 DR [facility], [facility]    penis surgery (venous ligation)      Old treatmt for erect dys - (ligation of the dorsal vein of the penis, operation not currently practiced)    SPINE SURGERY      lumbar fusion    TOTAL KNEE ARTHROPLASTY      Bilateral    UPPER GASTROINTESTINAL ENDOSCOPY  2008    in legacy tab     Family History   Problem Relation Age of Onset    Hypertension  Mother     Stroke Mother     Cancer Mother         lung (started smoking age 13)    Cancer Father         lung (started smoking age 8)    Heart attack Brother         massive heart attack at age 34     Social History     Tobacco Use    Smoking status: Never Smoker    Smokeless tobacco: Never Used   Substance Use Topics    Alcohol use: No     Alcohol/week: 0.0 standard drinks    Drug use: Never     OBJECTIVE:     Vital Signs (Most Recent)  Temp: 98.1 °F (36.7 °C) (02/14/22 0727)  Pulse: 72 (02/14/22 0727)  Resp: 16 (02/14/22 0727)  BP: (!) 149/75 (02/14/22 0727)  SpO2: 97 % (02/14/22 0727)    Physical Exam:                                                       GENERAL:  Comfortable, in no acute distress.                                 HEENT EXAM:  Nonicteric.  No adenopathy.  Oropharynx is clear.               NECK:  Supple.                                                               LUNGS:  Clear.                                                               CARDIAC:  Regular rate and rhythm.  S1, S2.  No murmur.                      ABDOMEN:  Soft, positive bowel sounds, nontender.  No hepatosplenomegaly or masses.  No rebound or guarding.                                             EXTREMITIES:  No edema.     MENTAL STATUS:  Normal, alert and oriented.    ASSESSMENT/PLAN:     Assessment: MARIO    Plan: VCE

## 2022-02-14 NOTE — DISCHARGE INSTRUCTIONS
Capsule Discharge Instructions     You have just swallowed a capsule endoscope.  This contains information about what to expect over the next 8 hours.  Please call our office if you have severe or persistent abdominal or chest pain, fever, difficulty swallowing or if you have any questions.  Our phone number is [phone].    Time Capsule ingested:_______________    · You may drink clear liquids (water, apple juice) 4 hours after swallowing the capsule.  · You may eat a light meal 6 hours after swallowing the capsule.  Medications may be resumed at 6 hours after swallowing the capsule.  · Do not exercise, avoid heavy lifting.  You may walk, sit and lay down.  You can drive a car.  You may return to work, if you work allows avoiding unsuitable environments and /or physical movements.  · Avoid going near MRI machines and radio transmitters.  You may use a computer, cell phone, radio or stereo.  · Do not stand directly next to another person undergoing capsule endoscopy.  · Try not to touch the recorder or the sensor array leads.  Do not remove the leads before 8 hours.  · Avoid getting the data recorder or sensor array leads wet.  · You may loosen the belt to allow yourself to go to the bathroom.  Do not take the belt off until the 8 hours have passed.  · Observe the LED light on the data recorder at least every 15 minutes.  If the light stops blinking, document the time and call our office.  · Return the unit to the hospital at the completion of 8 hour time frame.    May have clear liquids at ______________    May have light snack and medications at ______________    May remove the unit at ______________    Return the unit to Registration Desk at the completion of the study.    Post Capsule Instructions     This information is what to expect over the next 2 days.  Please call us or your doctor if you have severe or persistent abdominal or chest pain, fever, difficulty swallowing, or if you have any questions.   Our phone number is [phone].    · Pain:  Pain is uncommon following capsule endoscopy.  Should you feel sharp or persistent pain, please call your doctor's office.  · Nausea:  Nausea is also very uncommon and should it occur, please notify your doctor's office  · Diet:  You may eat and drink with no restrictions 8 hours after ingesting capsule.  · Activities:  Following the exam you may resume normal activities, including exercise.  · Medications:  You may resume your medications 6 hours after ingesting the capsule.  Do NOT make up doses you have missed, just resume your normal dosage.  · Further Testing:  Until the capsule passes, further testing which includes any type of MRI should be avoided.  If you have any type of MRI examination scheduled in the next 3 days, it should be postponed.  · The Capsule:  The capsule passes naturally in a bowel movement typically in about 24 hours.  Most likely, you will be unaware of its passage.  It does not need to be retrieved and can safely be flushed down the toilet.  Occasionally the capsule light will still be flashing when it passes.  Should you be concerned that the capsule didn't pass, in the absence of symptoms; an abdominal x-ray can be obtained after 7 days to confirm its passage.  · The  will make a beeping noise when finished.  It will shut off automatically.

## 2022-02-14 NOTE — PLAN OF CARE
Vss, maritza po fluids, denies pain, ambulates easily.  Discharge instructions provided and states understanding. States will return equipment this afternoon. States ready to go home.  Discharged from facility with family ambulatory

## 2022-02-15 PROCEDURE — 91110 GI TRC IMG INTRAL ESOPH-ILE: CPT | Mod: 26,,, | Performed by: INTERNAL MEDICINE

## 2022-02-15 PROCEDURE — 91110 PR GI TRACT CAPSULE ENDOSCOPY: ICD-10-PCS | Mod: 26,,, | Performed by: INTERNAL MEDICINE

## 2022-02-15 NOTE — PROVATION PATIENT INSTRUCTIONS
Discharge Summary/Instructions after an Endoscopic Procedure  Patient Name: Hima Mensah  Patient MRN: [id number]  Patient YOB: 1955  Tuesday, February 15, 2022  Martin Smith MD  Dear patient,  As a result of recent federal legislation (The Federal Cures Act), you may   receive lab or pathology results from your procedure in your [facility]   account before your physician is able to contact you. Your physician or   their representative will relay the results to you with their   recommendations at their soonest availability.  Thank you,  RESTRICTIONS:  During your procedure today, you received medications for sedation.  These   medications may affect your judgment, balance and coordination.  Therefore,   for 24 hours, you have the following restrictions:   - DO NOT drive a car, operate machinery, make legal/financial decisions,   sign important papers or drink alcohol.    ACTIVITY:  Today: no heavy lifting, straining or running due to procedural   sedation/anesthesia.  The following day: return to full activity including work.  DIET:  Eat and drink normally unless instructed otherwise.     TREATMENT FOR COMMON SIDE EFFECTS:  - Mild abdominal pain, nausea, belching, bloating or excessive gas:  rest,   eat lightly and use a heating pad.  - Sore Throat: treat with throat lozenges and/or gargle with warm salt   water.  - Because air was used during the procedure, expelling large amounts of air   from your rectum or belching is normal.  - If a bowel prep was taken, you may not have a bowel movement for 1-3 days.    This is normal.  SYMPTOMS TO WATCH FOR AND REPORT TO YOUR PHYSICIAN:  1. Abdominal pain or bloating, other than gas cramps.  2. Chest pain.  3. Back pain.  4. Signs of infection such as: chills or fever occurring within 24 hours   after the procedure.  5. Rectal bleeding, which would show as bright red, maroon, or black stools.   (A tablespoon of blood from the rectum is not serious, especially  if   hemorrhoids are present.)  6. Vomiting.  7. Weakness or dizziness.  GO DIRECTLY TO THE NEAREST EMERGENCY ROOM IF YOU HAVE ANY OF THE FOLLOWING:      Difficulty breathing              Chills and/or fever over 101 F   Persistent vomiting and/or vomiting blood   Severe abdominal pain   Severe chest pain   Black, tarry stools   Bleeding- more than one tablespoon   Any other symptom or condition that you feel may need urgent attention  Your doctor recommends these additional instructions:  If any biopsies were taken, your doctors clinic will contact you in 1 to 2   weeks with any results.  - Discharge patient to home.   - Advance diet as tolerated.   - Continue present medications including iron supplementation daily  - Discontinue NSAIDs  For questions, problems or results please call your physician - Martin Smith MD at Work:  [phone].  [facility], EMERGENCY ROOM PHONE NUMBER: [phone]  IF A COMPLICATION OR EMERGENCY SITUATION ARISES AND YOU ARE UNABLE TO REACH   YOUR PHYSICIAN - GO DIRECTLY TO THE EMERGENCY ROOM.  Martin Smith MD  2/15/2022 5:38:31 PM  This report has been verified and signed electronically.  Dear patient,  As a result of recent federal legislation (The Federal Cures Act), you may   receive lab or pathology results from your procedure in your [facility]   account before your physician is able to contact you. Your physician or   their representative will relay the results to you with their   recommendations at their soonest availability.  Thank you,  PROVATION

## 2022-02-21 ENCOUNTER — PATIENT MESSAGE (OUTPATIENT)
Dept: GASTROENTEROLOGY | Facility: CLINIC | Age: 67
End: 2022-02-21
Payer: MEDICARE

## 2022-02-21 NOTE — TELEPHONE ENCOUNTER
The repeat CT scan looks ok (prior small bowel inflammation resolved). The VCE also looks ok, just a small non-bleeding AVM (benign blood vessel). Recommend f/u with Dr Martinez.

## 2022-06-01 ENCOUNTER — OFFICE VISIT (OUTPATIENT)
Dept: UROLOGY | Facility: CLINIC | Age: 67
End: 2022-06-01
Payer: MEDICARE

## 2022-06-01 VITALS — BODY MASS INDEX: 26.53 KG/M2 | WEIGHT: 169.06 LBS | HEIGHT: 67 IN

## 2022-06-01 DIAGNOSIS — N50.811 TESTICULAR PAIN, RIGHT: ICD-10-CM

## 2022-06-01 DIAGNOSIS — N50.819 PAIN IN TESTICLE, UNSPECIFIED LATERALITY: Primary | ICD-10-CM

## 2022-06-01 DIAGNOSIS — N45.1 EPIDIDYMITIS: ICD-10-CM

## 2022-06-01 DIAGNOSIS — Z85.51 HISTORY OF BLADDER CANCER: ICD-10-CM

## 2022-06-01 LAB
BILIRUB SERPL-MCNC: NORMAL MG/DL
BLOOD URINE, POC: NORMAL
CLARITY, POC UA: CLEAR
COLOR, POC UA: YELLOW
GLUCOSE UR QL STRIP: >1000
KETONES UR QL STRIP: NORMAL
LEUKOCYTE ESTERASE URINE, POC: NORMAL
NITRITE, POC UA: NORMAL
PH, POC UA: 5.5
PROTEIN, POC: NORMAL
SPECIFIC GRAVITY, POC UA: 1.01
UROBILINOGEN, POC UA: NORMAL

## 2022-06-01 PROCEDURE — 3008F BODY MASS INDEX DOCD: CPT | Mod: CPTII,S$GLB,,

## 2022-06-01 PROCEDURE — 3008F PR BODY MASS INDEX (BMI) DOCUMENTED: ICD-10-PCS | Mod: CPTII,S$GLB,,

## 2022-06-01 PROCEDURE — 81002 URINALYSIS NONAUTO W/O SCOPE: CPT | Mod: S$GLB,,,

## 2022-06-01 PROCEDURE — 3288F FALL RISK ASSESSMENT DOCD: CPT | Mod: CPTII,S$GLB,,

## 2022-06-01 PROCEDURE — 1126F AMNT PAIN NOTED NONE PRSNT: CPT | Mod: CPTII,S$GLB,,

## 2022-06-01 PROCEDURE — 1101F PT FALLS ASSESS-DOCD LE1/YR: CPT | Mod: CPTII,S$GLB,,

## 2022-06-01 PROCEDURE — 1159F MED LIST DOCD IN RCRD: CPT | Mod: CPTII,S$GLB,,

## 2022-06-01 PROCEDURE — 4010F ACE/ARB THERAPY RXD/TAKEN: CPT | Mod: CPTII,S$GLB,,

## 2022-06-01 PROCEDURE — 1126F PR PAIN SEVERITY QUANTIFIED, NO PAIN PRESENT: ICD-10-PCS | Mod: CPTII,S$GLB,,

## 2022-06-01 PROCEDURE — 3288F PR FALLS RISK ASSESSMENT DOCUMENTED: ICD-10-PCS | Mod: CPTII,S$GLB,,

## 2022-06-01 PROCEDURE — 1101F PR PT FALLS ASSESS DOC 0-1 FALLS W/OUT INJ PAST YR: ICD-10-PCS | Mod: CPTII,S$GLB,,

## 2022-06-01 PROCEDURE — 1160F PR REVIEW ALL MEDS BY PRESCRIBER/CLIN PHARMACIST DOCUMENTED: ICD-10-PCS | Mod: CPTII,S$GLB,,

## 2022-06-01 PROCEDURE — 99999 PR PBB SHADOW E&M-EST. PATIENT-LVL IV: ICD-10-PCS | Mod: PBBFAC,,,

## 2022-06-01 PROCEDURE — 3051F PR MOST RECENT HEMOGLOBIN A1C LEVEL 7.0 - < 8.0%: ICD-10-PCS | Mod: CPTII,S$GLB,,

## 2022-06-01 PROCEDURE — 1159F PR MEDICATION LIST DOCUMENTED IN MEDICAL RECORD: ICD-10-PCS | Mod: CPTII,S$GLB,,

## 2022-06-01 PROCEDURE — 99213 OFFICE O/P EST LOW 20 MIN: CPT | Mod: S$GLB,,,

## 2022-06-01 PROCEDURE — 4010F PR ACE/ARB THEARPY RXD/TAKEN: ICD-10-PCS | Mod: CPTII,S$GLB,,

## 2022-06-01 PROCEDURE — 3051F HG A1C>EQUAL 7.0%<8.0%: CPT | Mod: CPTII,S$GLB,,

## 2022-06-01 PROCEDURE — 99213 PR OFFICE/OUTPT VISIT, EST, LEVL III, 20-29 MIN: ICD-10-PCS | Mod: S$GLB,,,

## 2022-06-01 PROCEDURE — 1160F RVW MEDS BY RX/DR IN RCRD: CPT | Mod: CPTII,S$GLB,,

## 2022-06-01 PROCEDURE — 81002 POCT URINE DIPSTICK WITHOUT MICROSCOPE: ICD-10-PCS | Mod: S$GLB,,,

## 2022-06-01 PROCEDURE — 99999 PR PBB SHADOW E&M-EST. PATIENT-LVL IV: CPT | Mod: PBBFAC,,,

## 2022-06-01 RX ORDER — DOXYCYCLINE 100 MG/1
100 CAPSULE ORAL 2 TIMES DAILY
Qty: 28 CAPSULE | Refills: 0 | Status: SHIPPED | OUTPATIENT
Start: 2022-06-01 | End: 2022-06-15

## 2022-06-01 RX ORDER — EMPAGLIFLOZIN 25 MG/1
25 TABLET, FILM COATED ORAL DAILY
COMMUNITY
Start: 2022-04-20 | End: 2022-06-16 | Stop reason: SDUPTHER

## 2022-06-01 NOTE — PROGRESS NOTES
[facility] Note  Staff: MAHAMED Pepe    PCP: MD Benjamin    Chief Complaint: Right Testicular Pain    Subjective:        HPI: Hima Mensah is a 67 y.o. male new patient to me presents today with complaint of right testicular pain that radiates to his lower abdomen. He states he felt this last night and a few days ago. He cannot take NSAIDS so he has not taken anty medications to alleviate his pain. He has been followed by Dr. Ordonez in the past and has been seen multiple times for this same complaint, however, pt does not remember this. He also has a history of a bladder tumor and is scheduled with Dr. Philippe for a cystoscopy in September. He is currently taking tamsulosin 0.8mg and avodart 0.5mg daily. He denies any new or worsening urinary symptoms.     Questions asked the pt during ov today:  Urgency: No, urge incontinence? No  NTF: 1-2x night  Dysuria: No  Gross Hematuria:No  Straining:No, Hesistancy:No, Intermittency:No}, Weak stream:No    Last PSA Screening:   Lab Results   Component Value Date    PSA 0.26 07/22/2021    PSA 0.67 03/07/2020    PSA 0.65 02/16/2019       History of Kidney Stones?:  No    Constipation issues?:  No    REVIEW OF SYSTEMS:  Review of Systems   Constitutional: Negative.  Negative for chills and fever.   HENT: Negative.    Eyes: Negative.    Respiratory: Negative.    Cardiovascular: Negative.    Gastrointestinal: Negative.  Negative for abdominal pain, nausea and vomiting.   Genitourinary: Negative.  Negative for dysuria, flank pain, frequency, hematuria and urgency.   Musculoskeletal: Negative.  Negative for back pain.   Skin: Negative.    Neurological: Negative.    Endo/Heme/Allergies: Negative.    Psychiatric/Behavioral: Negative.        PMHx:  Past Medical History:   Diagnosis Date    a Abnormal EKG     Dr. Arnoldo Jeter; 04/2021 [facility]giogram (Dr. ADEOLA Jeter) = Was Reportedly Normal (Per Patient's Report)    a Cardiac Diastolic Dysfunction      "Dr. Arnoldo Jeter    a Family H/O CAD ###    Dr. Arnoldo Jeter; 2/14/20 LCST = Normal (Report Scanned); 8/1/14 GIOVANI (Dr. Willem Swartz) = Normal At Submaximal Heart Rate    a Palpitations     Dr. Arnoldo Jeter; 6/20/16 24-Hr Holter = 2 Brief PSVT Episodes And Rare PACs And PVCs    a PSVT ###    Dr. Arnoldo Jeter Based On Results Of A 6/20/16 24-Hr Holter = 2 Brief PSVT Episodes And Rare PACs And PVCs    b Hypertension     b Microalbuminuria     c Hypercholesterolemia With Low HDL     7/21/17 RXd OTC Flaxseed Oil 3K Mg Daily And D/Cd OTC FO 2K Mg Daily; On Zetia 10 Mg Daily; Statins raise His Liver Enzymes    c Mildly Elevated CK Level 7/22/21 ###    d Type 2 Diabetes Mellitus     1/21/22 Referred To [facility]; 2/24/19 Added Amaryl 2 Mg qAM; He Has Had ADA Training But Doesn't Follow It    g Mild Chronic NCNC Anemia     Am Monitoring    i H/O COVID-19 Infection     His 1/1/22 COVID-189 Swab Test = Was Positive    j Chronic Mildly Elevated ALT Levels     7/27/21 RXd OTC Vitamin E 800 IU Daily    j GERD With Esophageal Stenosis     Dr. Mookie Bray    j H/O Adenomatous Colon Polyps On 7/24/20 TC     Dr. Chema Bray: "Repeat TC In 5 Years"    j Hemorrhoids     j Hepatic Steatosis     7/26/21 AA U/S = Normal (See Report).    j Small Intestine Perforation ####    Dr. Mookie Bray; [facility] 1/1/22-1/4/22 Stay For This    j Umbilical Hernia     1/21/22 Referred To Dr. Matthew pereira Early Stage Bladder Cancer #####    Dr. Louis Ordonez Removed This Polyp 11/2019    k Low Testosterone ###    Dr. Louis Ordonez; 1/17/18 RXd Testosterone 100 Mg Every 2 Weeks; 7/21/17 Increased From 2 Pumps To 3 Pumps Daily; On Topical Testosterone Supplement    l H/O Bilateral TKRs 2006     l Lumbar DDD S/P Sx 2011 With Chronic LBP     Dr. Alejandro Villegas    m Bilateral Lower Extremity Diabetic Peripheral Neuropathy     Mainly Mild Numbness In His Feet    m Chronic Fatigue ###    W/O Daytime Drowsiness Or Witnessed " Apnea; 7/22/21 TFTs, Vitamin B12, And Testosterone (On Supplementation) = Are Normal    p OU Cataracts     Dr. Blake Mitchell    p OU Hypermetropia     Dr. Blake Mitchell    Wellness Visit 7/19/2021        PSHx:  Past Surgical History:   Procedure Laterality Date    ANGIOGRAM, CORONARY, WITH LEFT HEART CATHETERIZATION N/A 03/25/2021    BACK SURGERY      CIRCUMCISION, PRIMARY      COLONOSCOPY N/A 7/24/2020    Procedure: COLONOSCOPY;  Surgeon: Chema Bray MD;  Location: [facility];  Service: Endoscopy;  Laterality: N/A;    COLONOSCOPY W/ POLYPECTOMY      ESOPHAGEAL DILATION      ESOPHAGOGASTRODUODENOSCOPY      ESOPHAGOGASTRODUODENOSCOPY N/A 2/3/2022    Procedure: EGD (ESOPHAGOGASTRODUODENOSCOPY);  Surgeon: Chema Bray MD;  Location: [facility];  Service: Endoscopy;  Laterality: N/A;    INTRALUMINAL GASTROINTESTINAL TRACT IMAGING VIA CAPSULE N/A 2/14/2022    Procedure: IMAGING PROCEDURE, GI TRACT, INTRALUMINAL, VIA CAPSULE;  Surgeon: Martin Smith MD;  Location: [facility];  Service: Endoscopy;  Laterality: N/A;    JOINT REPLACEMENT      bilateral knee replacements    nerve block in spine       PENILE PROSTHESIS IMPLANT      1/2021 DR CHIKA PRESSLEY [facility]    penis surgery (venous ligation)      Old treatmt for erect dys - (ligation of the dorsal vein of the penis, operation not currently practiced)    SPINE SURGERY      lumbar fusion    TOTAL KNEE ARTHROPLASTY      Bilateral    UPPER GASTROINTESTINAL ENDOSCOPY  2008    in legacy tab       Fam Hx:   malignancies: No    kidney stones: No     Soc Hx:  , lives in [place]    Allergies:  Atorvastatin, Pravastatin, Simvastatin, and Penicillins    Medications: reviewed     Objective:   There were no vitals filed for this visit.    Physical Exam  Constitutional:       Appearance: Normal appearance.   HENT:      Head: Normocephalic.      Mouth/Throat:      Mouth: Mucous membranes are moist.   Eyes:      Conjunctiva/sclera:  Conjunctivae normal.   Pulmonary:      Effort: Pulmonary effort is normal.   Abdominal:      Palpations: Abdomen is soft.      Tenderness: There is no abdominal tenderness. There is no right CVA tenderness or left CVA tenderness.   Genitourinary:     Comments: Penile prosthetic and pump palpated on exam. No testicular abnormalities felt, no pain with palpation, no swelling. Testes equal in size  Musculoskeletal:         General: Normal range of motion.      Cervical back: Normal range of motion.   Skin:     General: Skin is warm.   Neurological:      Mental Status: He is alert and oriented to person, place, and time.   Psychiatric:         Mood and Affect: Mood normal.         Behavior: Behavior normal.           LABS REVIEW:  UA today:  Color:Clear, Yellow  Spec. Grav.  1.015  PH  5.5  Negative for leukocytes, nitrates, protein, ketones, urobili, bili  Moderate blood--states chronic finding  Positive glucose    Assessment:       1. Pain in testicle, unspecified laterality    2. Testicular pain, right    3. Epididymitis    4. History of bladder cancer          Plan:     1. Will proceed with US of scrotum  2. Doxycycline 100mg BID x14 days sent to pharmacy, pt also advised to rest area, elevate, scrotal support, and ice as needed    F/u As Needed    [facility]: Active    MAHAMED Pepe

## 2022-06-03 ENCOUNTER — TELEPHONE (OUTPATIENT)
Dept: UROLOGY | Facility: CLINIC | Age: 67
End: 2022-06-03
Payer: MEDICARE

## 2022-06-03 NOTE — TELEPHONE ENCOUNTER
----- Message from May Blanton sent at 6/3/2022 10:39 AM CDT -----  Contact: pt [phone]  Patient was seen on 06/01/2022 and has some questions regarding his upcoming US.    Please call and advise.    Thank You

## 2022-06-03 NOTE — TELEPHONE ENCOUNTER
Patient passed stone and assumed it was the source of his pain. States he wasnts to cancel US. Will cancel for him.

## 2022-06-16 PROBLEM — E11.49 TYPE 2 DIABETES MELLITUS WITH NEUROLOGIC COMPLICATION: Status: RESOLVED | Noted: 2022-01-21 | Resolved: 2022-06-16

## 2022-06-16 PROBLEM — E11.49 TYPE 2 DIABETES MELLITUS WITH NEUROLOGIC COMPLICATION, WITHOUT LONG-TERM CURRENT USE OF INSULIN: Status: ACTIVE | Noted: 2022-06-16

## 2022-06-16 PROBLEM — K63.1 SMALL BOWEL PERFORATION: Status: ACTIVE | Noted: 2022-06-16

## 2022-09-08 ENCOUNTER — PROCEDURE VISIT (OUTPATIENT)
Dept: UROLOGY | Facility: CLINIC | Age: 67
End: 2022-09-08
Payer: MEDICARE

## 2022-09-08 VITALS — HEIGHT: 67 IN | WEIGHT: 163.81 LBS | BODY MASS INDEX: 25.71 KG/M2

## 2022-09-08 DIAGNOSIS — Z12.5 SCREENING FOR PROSTATE CANCER: ICD-10-CM

## 2022-09-08 DIAGNOSIS — Z85.51 HX OF BLADDER CANCER: Primary | ICD-10-CM

## 2022-09-08 PROCEDURE — 52000 CYSTOURETHROSCOPY: CPT | Mod: S$GLB,,, | Performed by: UROLOGY

## 2022-09-08 PROCEDURE — 52000 CYSTOSCOPY: ICD-10-PCS | Mod: S$GLB,,, | Performed by: UROLOGY

## 2022-09-08 RX ORDER — BEMPEDOIC ACID AND EZETIMIBE 180; 10 MG/1; MG/1
TABLET, FILM COATED ORAL
COMMUNITY
End: 2023-02-02

## 2022-09-08 RX ORDER — LEVOCETIRIZINE DIHYDROCHLORIDE 5 MG/1
5 TABLET, FILM COATED ORAL DAILY
COMMUNITY

## 2022-09-08 NOTE — PROCEDURES
Cystoscopy    Date/Time: 9/8/2022 10:00 AM  Performed by: LEE Philippe MD  Authorized by: LEE Philippe MD     Consent Done?:  Yes (Written)  Timeout: prior to procedure the correct patient, procedure, and site was verified    Prep: patient was prepped and draped in usual sterile fashion    Anesthesia:  Lidocaine jelly  Indications: history bladder cancer    Position:  Supine  Anesthesia:  Lidocaine jelly  Patient sedated?: No    Preparation: Patient was prepped and draped in usual sterile fashion    Scope type:  Flexible cystoscope   patient tolerated the procedure well with no immediate complications    Blood Loss:  None    67-year-old with a history no bladder cancer.  He underwent fulguration in the office in 2019 for a papillary tumor consistent with urothelial cancer.  No biopsy was taking so no tissue pathology to confirm diagnosis.  He has had no recurrences since that time.  He is asymptomatic.  He denies hematuria and dysuria.  He has no bothersome urinary symptoms    The flexible cystoscope was placed into the urethra and carefully advanced into the bladder.  A careful cystoscopic exam was then performed.  The entire bladder mucosa was systematically visualized.  Findings include mild bladder wall trabeculation.  There were no lesions, masses foreign bodies or stones.   Each ureteral orifices were visualized and both had clear efflux of urine.  On retroflexion there was a no significant intravesical gland.  The cystoscope was then removed and I examined the entire length of the urethra.  There was no significant enlargement of the prostate and the urethra otherwise appeared normal.  He tolerated the procedure well.  There were no complications    DONNIE:  30g, s/s/a    Impression:  Normal cystoscopy.    Annual follow-up for cystoscopy

## 2023-03-03 PROBLEM — N20.1 URETEROLITHIASIS: Status: ACTIVE | Noted: 2023-03-03

## 2023-03-06 ENCOUNTER — TELEPHONE (OUTPATIENT)
Dept: UROLOGY | Facility: CLINIC | Age: 68
End: 2023-03-06
Payer: MEDICARE

## 2023-03-06 DIAGNOSIS — N20.0 KIDNEY STONE: ICD-10-CM

## 2023-03-06 DIAGNOSIS — Z85.51 HX OF BLADDER CANCER: Primary | ICD-10-CM

## 2023-03-06 NOTE — TELEPHONE ENCOUNTER
----- Message from Mariam Arriolary sent at 3/6/2023  8:14 AM CST -----  .Type:  Patient Call Back    Who Called: PT       Does the patient know what this is regarding?: PT CALLED TO SPEAK WITH THE OFFICE ABOUT A PROCEDURE HE WAS TOLD HE NEEDED FROM HIS RECENT ER VISIT ON 3/3/2023 PLEASE REACH OUT TO PT     Would the patient rather a call back YES     Best Call Back Number: [phone]    Additional Information: Thank You

## 2023-03-08 ENCOUNTER — OFFICE VISIT (OUTPATIENT)
Dept: UROLOGY | Facility: CLINIC | Age: 68
End: 2023-03-08
Payer: MEDICARE

## 2023-03-08 ENCOUNTER — HOSPITAL ENCOUNTER (OUTPATIENT)
Dept: RADIOLOGY | Facility: HOSPITAL | Age: 68
Discharge: HOME | End: 2023-03-08
Attending: UROLOGY
Payer: MEDICARE

## 2023-03-08 VITALS — BODY MASS INDEX: 25.78 KG/M2 | HEIGHT: 67 IN | WEIGHT: 164.25 LBS

## 2023-03-08 DIAGNOSIS — Z85.51 HX OF BLADDER CANCER: ICD-10-CM

## 2023-03-08 DIAGNOSIS — N12 PYELONEPHRITIS: ICD-10-CM

## 2023-03-08 DIAGNOSIS — N13.2 HYDRONEPHROSIS WITH OBSTRUCTING CALCULUS: ICD-10-CM

## 2023-03-08 DIAGNOSIS — R10.9 FLANK PAIN: ICD-10-CM

## 2023-03-08 DIAGNOSIS — N20.1 URETERAL STONE: Primary | ICD-10-CM

## 2023-03-08 DIAGNOSIS — N20.0 KIDNEY STONE: ICD-10-CM

## 2023-03-08 PROCEDURE — 1159F MED LIST DOCD IN RCRD: CPT | Mod: CPTII,S$GLB,, | Performed by: UROLOGY

## 2023-03-08 PROCEDURE — 74018 RADEX ABDOMEN 1 VIEW: CPT | Mod: TC,FY,PO

## 2023-03-08 PROCEDURE — 99999 PR PBB SHADOW E&M-EST. PATIENT-LVL III: ICD-10-PCS | Mod: PBBFAC,,, | Performed by: UROLOGY

## 2023-03-08 PROCEDURE — 3008F PR BODY MASS INDEX (BMI) DOCUMENTED: ICD-10-PCS | Mod: CPTII,S$GLB,, | Performed by: UROLOGY

## 2023-03-08 PROCEDURE — 1126F AMNT PAIN NOTED NONE PRSNT: CPT | Mod: CPTII,S$GLB,, | Performed by: UROLOGY

## 2023-03-08 PROCEDURE — 99214 OFFICE O/P EST MOD 30 MIN: CPT | Mod: S$GLB,,, | Performed by: UROLOGY

## 2023-03-08 PROCEDURE — 1159F PR MEDICATION LIST DOCUMENTED IN MEDICAL RECORD: ICD-10-PCS | Mod: CPTII,S$GLB,, | Performed by: UROLOGY

## 2023-03-08 PROCEDURE — 74018 XR ABDOMEN AP 1 VIEW: ICD-10-PCS | Mod: 26,,, | Performed by: RADIOLOGY

## 2023-03-08 PROCEDURE — 3008F BODY MASS INDEX DOCD: CPT | Mod: CPTII,S$GLB,, | Performed by: UROLOGY

## 2023-03-08 PROCEDURE — 1101F PT FALLS ASSESS-DOCD LE1/YR: CPT | Mod: CPTII,S$GLB,, | Performed by: UROLOGY

## 2023-03-08 PROCEDURE — 1126F PR PAIN SEVERITY QUANTIFIED, NO PAIN PRESENT: ICD-10-PCS | Mod: CPTII,S$GLB,, | Performed by: UROLOGY

## 2023-03-08 PROCEDURE — 3288F PR FALLS RISK ASSESSMENT DOCUMENTED: ICD-10-PCS | Mod: CPTII,S$GLB,, | Performed by: UROLOGY

## 2023-03-08 PROCEDURE — 99214 PR OFFICE/OUTPT VISIT, EST, LEVL IV, 30-39 MIN: ICD-10-PCS | Mod: S$GLB,,, | Performed by: UROLOGY

## 2023-03-08 PROCEDURE — 99999 PR PBB SHADOW E&M-EST. PATIENT-LVL III: CPT | Mod: PBBFAC,,, | Performed by: UROLOGY

## 2023-03-08 PROCEDURE — 3288F FALL RISK ASSESSMENT DOCD: CPT | Mod: CPTII,S$GLB,, | Performed by: UROLOGY

## 2023-03-08 PROCEDURE — 1101F PR PT FALLS ASSESS DOC 0-1 FALLS W/OUT INJ PAST YR: ICD-10-PCS | Mod: CPTII,S$GLB,, | Performed by: UROLOGY

## 2023-03-08 PROCEDURE — 74018 RADEX ABDOMEN 1 VIEW: CPT | Mod: 26,,, | Performed by: RADIOLOGY

## 2023-03-08 NOTE — H&P (VIEW-ONLY)
Subjective:       Patient ID: Hima Mensah is a 67 y.o. male.    Chief Complaint: Nephrolithiasis (Left side)    HPI    65-year-old with acute onset left flank pain with associated nausea.  This began 3 days ago.  He was seen in the emergency room.  Dr. Valente placed a left ureteral stent.  CT scan was obtained in the emergency room and I reviewed those images.  There was a 1.5 cm stone at the left UPJ.  KUB today is also reviewed.  The stone appears to have been pushed into the left lower pole the stents in good position.  There is perhaps another small 3-4 mm stone in the proximal ureter along the course of the stent.  He does have a history of stones but no previous stone surgeries.  He has been having fever as well and is currently taking a course of Bactrim.  He is had no fever in the last 48 hours.  We reviewed the images and we discussed treatment options.  We discussed shockwave lithotripsy including its limitations.    Review of Systems   Constitutional:  Negative for fever.   Genitourinary:  Negative for dysuria and hematuria.     Objective:      Physical Exam  Vitals reviewed.   Constitutional:       Appearance: He is well-developed.   HENT:      Head: Normocephalic and atraumatic.   Eyes:      Conjunctiva/sclera: Conjunctivae normal.   Cardiovascular:      Rate and Rhythm: Normal rate.   Pulmonary:      Effort: Pulmonary effort is normal.   Abdominal:      Tenderness: There is no right CVA tenderness or left CVA tenderness.   Musculoskeletal:         General: Normal range of motion.   Skin:     General: Skin is warm and dry.      Findings: No rash.   Neurological:      Mental Status: He is alert and oriented to person, place, and time.       Assessment:       1. Ureteral stone    2. Hydronephrosis with obstructing calculus    3. Flank pain    4. Pyelonephritis    5. Hx of bladder cancer          Plan:       Ureteral stone    Hydronephrosis with obstructing calculus    Flank pain    Pyelonephritis    Hx  of bladder cancer      We will proceed with shockwave lithotripsy.  I explained the procedure in detail.  Recommend a limited bowel prep the night before the procedure.  Hold aspirin for at least 5 days.

## 2023-03-10 ENCOUNTER — TELEPHONE (OUTPATIENT)
Dept: UROLOGY | Facility: CLINIC | Age: 68
End: 2023-03-10
Payer: MEDICARE

## 2023-03-10 DIAGNOSIS — N20.1 URETERAL STONE: Primary | ICD-10-CM

## 2023-03-10 NOTE — TELEPHONE ENCOUNTER
Spoke with Berkley at Dr Jeter office in regards to patient clearance she states they are working on patient clearance now and will fax back to our office, patient reports he has stopped taking his Aspirin .

## 2023-03-13 ENCOUNTER — ANESTHESIA EVENT (OUTPATIENT)
Dept: SURGERY | Facility: HOSPITAL | Age: 68
End: 2023-03-13
Payer: MEDICARE

## 2023-03-14 ENCOUNTER — ANESTHESIA (OUTPATIENT)
Dept: SURGERY | Facility: HOSPITAL | Age: 68
End: 2023-03-14
Payer: MEDICARE

## 2023-03-14 ENCOUNTER — HOSPITAL ENCOUNTER (OUTPATIENT)
Facility: HOSPITAL | Age: 68
Discharge: HOME | End: 2023-03-14
Attending: UROLOGY | Admitting: UROLOGY
Payer: MEDICARE

## 2023-03-14 ENCOUNTER — TELEPHONE (OUTPATIENT)
Dept: UROLOGY | Facility: CLINIC | Age: 68
End: 2023-03-14

## 2023-03-14 DIAGNOSIS — N20.0 KIDNEY STONE: ICD-10-CM

## 2023-03-14 DIAGNOSIS — N20.0 KIDNEY STONE: Primary | ICD-10-CM

## 2023-03-14 DIAGNOSIS — N20.1 URETEROLITHIASIS: Primary | ICD-10-CM

## 2023-03-14 LAB — GLUCOSE SERPL-MCNC: 145 MG/DL (ref 70–110)

## 2023-03-14 PROCEDURE — 25000003 PHARM REV CODE 250: Mod: PO | Performed by: NURSE ANESTHETIST, CERTIFIED REGISTERED

## 2023-03-14 PROCEDURE — D9220A PRA ANESTHESIA: ICD-10-PCS | Mod: CRNA,,, | Performed by: NURSE ANESTHETIST, CERTIFIED REGISTERED

## 2023-03-14 PROCEDURE — 37000009 HC ANESTHESIA EA ADD 15 MINS: Mod: PO | Performed by: UROLOGY

## 2023-03-14 PROCEDURE — 63600175 PHARM REV CODE 636 W HCPCS: Mod: PO | Performed by: UROLOGY

## 2023-03-14 PROCEDURE — 36000705 HC OR TIME LEV I EA ADD 15 MIN: Mod: PO | Performed by: UROLOGY

## 2023-03-14 PROCEDURE — D9220A PRA ANESTHESIA: ICD-10-PCS | Mod: ANES,,, | Performed by: ANESTHESIOLOGY

## 2023-03-14 PROCEDURE — 71000033 HC RECOVERY, INTIAL HOUR: Mod: PO | Performed by: UROLOGY

## 2023-03-14 PROCEDURE — D9220A PRA ANESTHESIA: Mod: CRNA,,, | Performed by: NURSE ANESTHETIST, CERTIFIED REGISTERED

## 2023-03-14 PROCEDURE — 27200651 HC AIRWAY, LMA: Mod: PO | Performed by: ANESTHESIOLOGY

## 2023-03-14 PROCEDURE — D9220A PRA ANESTHESIA: Mod: ANES,,, | Performed by: ANESTHESIOLOGY

## 2023-03-14 PROCEDURE — 37000008 HC ANESTHESIA 1ST 15 MINUTES: Mod: PO | Performed by: UROLOGY

## 2023-03-14 PROCEDURE — 63600175 PHARM REV CODE 636 W HCPCS: Mod: PO | Performed by: NURSE ANESTHETIST, CERTIFIED REGISTERED

## 2023-03-14 PROCEDURE — 50590 PR FRAGMENT KIDNEY STONE/ ESWL: ICD-10-PCS | Mod: LT,,, | Performed by: UROLOGY

## 2023-03-14 PROCEDURE — 82962 GLUCOSE BLOOD TEST: CPT | Mod: PO | Performed by: UROLOGY

## 2023-03-14 PROCEDURE — 71000015 HC POSTOP RECOV 1ST HR: Mod: PO | Performed by: UROLOGY

## 2023-03-14 PROCEDURE — 63600175 PHARM REV CODE 636 W HCPCS: Mod: PO | Performed by: ANESTHESIOLOGY

## 2023-03-14 PROCEDURE — 50590 FRAGMENTING OF KIDNEY STONE: CPT | Mod: LT,,, | Performed by: UROLOGY

## 2023-03-14 PROCEDURE — 36000704 HC OR TIME LEV I 1ST 15 MIN: Mod: PO | Performed by: UROLOGY

## 2023-03-14 RX ORDER — HYDROMORPHONE HYDROCHLORIDE 2 MG/ML
0.2 INJECTION, SOLUTION INTRAMUSCULAR; INTRAVENOUS; SUBCUTANEOUS EVERY 5 MIN PRN
Status: DISCONTINUED | OUTPATIENT
Start: 2023-03-14 | End: 2023-03-14 | Stop reason: HOSPADM

## 2023-03-14 RX ORDER — OXYCODONE HYDROCHLORIDE 5 MG/1
5 TABLET ORAL
Status: DISCONTINUED | OUTPATIENT
Start: 2023-03-14 | End: 2023-03-14 | Stop reason: HOSPADM

## 2023-03-14 RX ORDER — DIPHENHYDRAMINE HYDROCHLORIDE 50 MG/ML
INJECTION INTRAMUSCULAR; INTRAVENOUS
Status: DISCONTINUED | OUTPATIENT
Start: 2023-03-14 | End: 2023-03-14

## 2023-03-14 RX ORDER — FENTANYL CITRATE 50 UG/ML
25 INJECTION, SOLUTION INTRAMUSCULAR; INTRAVENOUS EVERY 5 MIN PRN
Status: DISCONTINUED | OUTPATIENT
Start: 2023-03-14 | End: 2023-03-14 | Stop reason: HOSPADM

## 2023-03-14 RX ORDER — PHENYLEPHRINE HYDROCHLORIDE 10 MG/ML
INJECTION INTRAVENOUS
Status: DISCONTINUED | OUTPATIENT
Start: 2023-03-14 | End: 2023-03-14

## 2023-03-14 RX ORDER — SODIUM CHLORIDE, SODIUM LACTATE, POTASSIUM CHLORIDE, CALCIUM CHLORIDE 600; 310; 30; 20 MG/100ML; MG/100ML; MG/100ML; MG/100ML
INJECTION, SOLUTION INTRAVENOUS CONTINUOUS
Status: DISCONTINUED | OUTPATIENT
Start: 2023-03-14 | End: 2023-03-14 | Stop reason: HOSPADM

## 2023-03-14 RX ORDER — PROPOFOL 10 MG/ML
VIAL (ML) INTRAVENOUS
Status: DISCONTINUED | OUTPATIENT
Start: 2023-03-14 | End: 2023-03-14

## 2023-03-14 RX ORDER — LIDOCAINE HYDROCHLORIDE 10 MG/ML
1 INJECTION, SOLUTION EPIDURAL; INFILTRATION; INTRACAUDAL; PERINEURAL ONCE
Status: DISCONTINUED | OUTPATIENT
Start: 2023-03-14 | End: 2023-03-14 | Stop reason: HOSPADM

## 2023-03-14 RX ORDER — ONDANSETRON 2 MG/ML
INJECTION INTRAMUSCULAR; INTRAVENOUS
Status: DISCONTINUED | OUTPATIENT
Start: 2023-03-14 | End: 2023-03-14

## 2023-03-14 RX ORDER — LIDOCAINE HYDROCHLORIDE 20 MG/ML
INJECTION INTRAVENOUS
Status: DISCONTINUED | OUTPATIENT
Start: 2023-03-14 | End: 2023-03-14

## 2023-03-14 RX ORDER — SODIUM CHLORIDE 9 MG/ML
INJECTION, SOLUTION INTRAVENOUS CONTINUOUS
Status: DISCONTINUED | OUTPATIENT
Start: 2023-03-14 | End: 2023-03-14 | Stop reason: HOSPADM

## 2023-03-14 RX ORDER — FENTANYL CITRATE 50 UG/ML
INJECTION, SOLUTION INTRAMUSCULAR; INTRAVENOUS
Status: DISCONTINUED | OUTPATIENT
Start: 2023-03-14 | End: 2023-03-14

## 2023-03-14 RX ORDER — CEFAZOLIN SODIUM 2 G/50ML
2 SOLUTION INTRAVENOUS
Status: COMPLETED | OUTPATIENT
Start: 2023-03-14 | End: 2023-03-14

## 2023-03-14 RX ORDER — MEPERIDINE HYDROCHLORIDE 50 MG/ML
12.5 INJECTION INTRAMUSCULAR; INTRAVENOUS; SUBCUTANEOUS ONCE
Status: DISCONTINUED | OUTPATIENT
Start: 2023-03-14 | End: 2023-03-14 | Stop reason: HOSPADM

## 2023-03-14 RX ORDER — ACETAMINOPHEN 10 MG/ML
INJECTION, SOLUTION INTRAVENOUS
Status: DISCONTINUED | OUTPATIENT
Start: 2023-03-14 | End: 2023-03-14

## 2023-03-14 RX ORDER — DEXAMETHASONE SODIUM PHOSPHATE 4 MG/ML
INJECTION, SOLUTION INTRA-ARTICULAR; INTRALESIONAL; INTRAMUSCULAR; INTRAVENOUS; SOFT TISSUE
Status: DISCONTINUED | OUTPATIENT
Start: 2023-03-14 | End: 2023-03-14

## 2023-03-14 RX ORDER — HYDROCODONE BITARTRATE AND ACETAMINOPHEN 5; 325 MG/1; MG/1
1 TABLET ORAL EVERY 4 HOURS PRN
Qty: 15 TABLET | Refills: 0 | Status: SHIPPED | OUTPATIENT
Start: 2023-03-14 | End: 2023-06-23

## 2023-03-14 RX ORDER — DIPHENHYDRAMINE HYDROCHLORIDE 50 MG/ML
12.5 INJECTION INTRAMUSCULAR; INTRAVENOUS EVERY 6 HOURS PRN
Status: DISCONTINUED | OUTPATIENT
Start: 2023-03-14 | End: 2023-03-14 | Stop reason: HOSPADM

## 2023-03-14 RX ORDER — MIDAZOLAM HYDROCHLORIDE 1 MG/ML
INJECTION INTRAMUSCULAR; INTRAVENOUS
Status: DISCONTINUED | OUTPATIENT
Start: 2023-03-14 | End: 2023-03-14

## 2023-03-14 RX ADMIN — PROPOFOL 150 MG: 10 INJECTION, EMULSION INTRAVENOUS at 10:03

## 2023-03-14 RX ADMIN — GLYCOPYRROLATE 0.2 MG: 0.2 INJECTION, SOLUTION INTRAMUSCULAR; INTRAVENOUS at 10:03

## 2023-03-14 RX ADMIN — DEXAMETHASONE SODIUM PHOSPHATE 4 MG: 4 INJECTION, SOLUTION INTRAMUSCULAR; INTRAVENOUS at 10:03

## 2023-03-14 RX ADMIN — PHENYLEPHRINE HYDROCHLORIDE 100 MCG: 10 INJECTION INTRAVENOUS at 10:03

## 2023-03-14 RX ADMIN — CEFAZOLIN SODIUM 2 G: 2 SOLUTION INTRAVENOUS at 10:03

## 2023-03-14 RX ADMIN — PHENYLEPHRINE HYDROCHLORIDE 50 MCG: 10 INJECTION INTRAVENOUS at 10:03

## 2023-03-14 RX ADMIN — LIDOCAINE HYDROCHLORIDE 40 MG: 20 INJECTION INTRAVENOUS at 10:03

## 2023-03-14 RX ADMIN — MIDAZOLAM HYDROCHLORIDE 2 MG: 1 INJECTION, SOLUTION INTRAMUSCULAR; INTRAVENOUS at 10:03

## 2023-03-14 RX ADMIN — ONDANSETRON 4 MG: 2 INJECTION, SOLUTION INTRAMUSCULAR; INTRAVENOUS at 10:03

## 2023-03-14 RX ADMIN — ACETAMINOPHEN 1000 MG: 10 INJECTION, SOLUTION INTRAVENOUS at 10:03

## 2023-03-14 RX ADMIN — DIPHENHYDRAMINE HYDROCHLORIDE 6.25 MG: 50 INJECTION INTRAMUSCULAR; INTRAVENOUS at 10:03

## 2023-03-14 RX ADMIN — SODIUM CHLORIDE, POTASSIUM CHLORIDE, SODIUM LACTATE AND CALCIUM CHLORIDE: 600; 310; 30; 20 INJECTION, SOLUTION INTRAVENOUS at 09:03

## 2023-03-14 RX ADMIN — FENTANYL CITRATE 50 MCG: 50 INJECTION, SOLUTION INTRAMUSCULAR; INTRAVENOUS at 10:03

## 2023-03-14 NOTE — DISCHARGE INSTRUCTIONS
Department of General Surgery     [facility]          LITHOTRIPSY     After Surgery     DO:     Minimal activity for 24 hours.    May shower or tub bathe today.    Advance diet as tolerated.    Drink plenty of liquids to stay hydrated.     Strain all urine. Collect fragments/stones in the container provided. Bring to your follow up appointment.     Resume home medications as prescribed.    DO NOT:     Do not drive for 24 hours or while taking narcotic pain medication.    Do not take additional tylenol/acetaminophen while taking narcotic pain medication that contains tylenol/acetaminophen.     CALL PHYSICIAN FOR:     Unable to urinate within 6 hours after surgery.    Fever greater than 101.    Persistent pain not relieved by pain medication.     Bloody urine with significant clots.         Contact your doctor for emergencies at [phone].

## 2023-03-14 NOTE — ANESTHESIA POSTPROCEDURE EVALUATION
Anesthesia Post Evaluation    Patient: Hima Mensah    Procedure(s) Performed: Procedure(s) (LRB):  LITHOTRIPSY, ESWL (Left)    Final Anesthesia Type: general      Patient location during evaluation: PACU  Patient participation: Yes- Able to Participate  Level of consciousness: sedated and awake  Post-procedure vital signs: reviewed and stable  Pain management: adequate  Airway patency: patent    PONV status at discharge: No PONV  Anesthetic complications: no      Cardiovascular status: blood pressure returned to baseline  Respiratory status: spontaneous ventilation  Hydration status: euvolemic  Follow-up not needed.          Vitals Value Taken Time   /69 03/14/23 1106   Temp  03/14/23 1151   Pulse 84 03/14/23 1106   Resp 17 03/14/23 1106   SpO2 97 % 03/14/23 1106         Event Time   Out of Recovery 10:58:00         Pain/Tracy Score: Tracy Score: 6 (3/14/2023 10:46 AM)

## 2023-03-14 NOTE — BRIEF OP NOTE
[facility] - Surgery  Brief Operative Note    Surgery Date: 3/14/2023     Surgeon(s) and Role:     * LEE Philippe MD - Primary    Assisting Surgeon: None    Pre-op Diagnosis:  Ureteral stone [N20.1]    Post-op Diagnosis:  Post-Op Diagnosis Codes:     * Ureteral stone [N20.1]    Procedure(s) (LRB):  LITHOTRIPSY, ESWL (Left)    Anesthesia: General    Operative Findings:  Large stone in the left lower pole, small stone in the left mid ureter.    Estimated Blood Loss: * No values recorded between 3/14/2023 10:13 AM and 3/14/2023 10:44 AM *         Specimens:   Specimen (24h ago, onward)      None              Discharge Note    OUTCOME: Patient tolerated treatment/procedure well without complication and is now ready for discharge.    DISPOSITION: Home or Self Care    FINAL DIAGNOSIS:  Ureterolithiasis    FOLLOWUP: In clinic    DISCHARGE INSTRUCTIONS:    Discharge Procedure Orders   Diet Adult Regular     Activity as tolerated   Scheduling Instructions: Keep hydrated  Strain Urine  RTC 2 weeks

## 2023-03-14 NOTE — ANESTHESIA PROCEDURE NOTES
LMA insertion    Date/Time: 3/14/2023 10:08 AM  Performed by: Shavonne Vasquez CRNA  Authorized by: Uziel Gregory MD     Intubation:     Induction:  Intravenous    Intubated:  Postinduction    Mask Ventilation:  N/a    Attempts:  1    Attempted By:  CRNA    Difficult Airway Encountered?: No      Complications:  None    Airway Device:  Supraglottic airway/LMA    Airway Device Size:  4.0    Style/Cuff Inflation:  Cuffed (inflated to minimal occlusive pressure)    Secured at:  The lips    Placement Verified By:  Capnometry    Complicating Factors:  None    Findings Post-Intubation:  BS equal bilateral and atraumatic/condition of teeth unchanged

## 2023-03-14 NOTE — OP NOTE
Date of Procedure:  March 14, 2023    Procedure:  Left Extracorporeal shockwave lithotripsy    Surgeon:   LEE Philippe MD     Assisting Surgeon: None    Pre-Operative Diagnosis:  Left kidney stone    Post-Operative Diagnosis:  Left kidney stone    Anesthesia: General    Technical Procedures Used:  Shockwave lithotripsy    Description of the Findings of the Procedure:  Large stone in the left lower pole, small stone in the left mid ureter.    Complications: No    Estimated Blood Loss (EBL):  None      Specimens:  None    Indications:  67-year-old with a large kidney stone.  Stent was placed previously.  He is scheduled for shockwave lithotripsy.    Procedure in Detail:  The patient was brought to the operating room and placed supine on the Lithotripter table.  The patient was given preoperative antibiotics.  After adequate general anesthesia was achieved, we used fluoroscopy and were able to visualize the stones.  The large stone stone measured 12 mm and was located the left lower pole.  We then focus the shock waves on the stone using fluoroscopy in multiple planes.  We then delivered a total of 2700 shocks at 26 kilovolts.  On subsequent fluoroscopic images there appeared to be adequate fragmentation of the stone.  We then refocus shock waves on the small stone along the course of the stent in the mid ureter.  We delivered another 300 shocks to the stone.  The patient tolerated the procedure well there were no complications.  The patient was then awakened transported to the PACU in stable condition.

## 2023-03-14 NOTE — TRANSFER OF CARE
"Anesthesia Transfer of Care Note    Patient: Hima Mensah    Procedure(s) Performed: Procedure(s) (LRB):  LITHOTRIPSY, ESWL (Left)    Patient location: PACU    Anesthesia Type: general    Transport from OR: Transported from OR on room air with adequate spontaneous ventilation    Post pain: adequate analgesia    Post assessment: no apparent anesthetic complications and tolerated procedure well    Post vital signs: stable    Level of consciousness: responds to stimulation    Nausea/Vomiting: no nausea/vomiting    Complications: none    Transfer of care protocol was followed      Last vitals:   Visit Vitals  BP (!) 150/72   Pulse 82   Temp 36.6 °C (97.9 °F) (Skin)   Resp 16   Ht 5' 7" (1.702 m)   Wt 73.5 kg (162 lb)   SpO2 98%   BMI 25.37 kg/m²     "

## 2023-03-15 VITALS
BODY MASS INDEX: 25.43 KG/M2 | DIASTOLIC BLOOD PRESSURE: 69 MMHG | HEIGHT: 67 IN | HEART RATE: 84 BPM | WEIGHT: 162 LBS | OXYGEN SATURATION: 97 % | TEMPERATURE: 98 F | SYSTOLIC BLOOD PRESSURE: 116 MMHG | RESPIRATION RATE: 17 BRPM

## 2023-03-17 ENCOUNTER — PATIENT MESSAGE (OUTPATIENT)
Dept: UROLOGY | Facility: CLINIC | Age: 68
End: 2023-03-17
Payer: MEDICARE

## 2023-03-17 ENCOUNTER — TELEPHONE (OUTPATIENT)
Dept: UROLOGY | Facility: CLINIC | Age: 68
End: 2023-03-17
Payer: MEDICARE

## 2023-03-17 DIAGNOSIS — N20.0 KIDNEY STONE: Primary | ICD-10-CM

## 2023-03-20 ENCOUNTER — TELEPHONE (OUTPATIENT)
Dept: UROLOGY | Facility: CLINIC | Age: 68
End: 2023-03-20
Payer: MEDICARE

## 2023-04-06 ENCOUNTER — PROCEDURE VISIT (OUTPATIENT)
Dept: UROLOGY | Facility: CLINIC | Age: 68
End: 2023-04-06
Payer: MEDICARE

## 2023-04-06 ENCOUNTER — HOSPITAL ENCOUNTER (OUTPATIENT)
Dept: RADIOLOGY | Facility: HOSPITAL | Age: 68
Discharge: HOME | End: 2023-04-06
Attending: UROLOGY
Payer: MEDICARE

## 2023-04-06 VITALS — BODY MASS INDEX: 25.44 KG/M2 | HEIGHT: 67 IN | WEIGHT: 162.06 LBS

## 2023-04-06 DIAGNOSIS — N20.0 KIDNEY STONE: ICD-10-CM

## 2023-04-06 PROCEDURE — 52310: ICD-10-PCS | Mod: 58,S$GLB,, | Performed by: UROLOGY

## 2023-04-06 PROCEDURE — 82365 CALCULUS SPECTROSCOPY: CPT | Performed by: UROLOGY

## 2023-04-06 PROCEDURE — 74018 RADEX ABDOMEN 1 VIEW: CPT | Mod: 26,,, | Performed by: RADIOLOGY

## 2023-04-06 PROCEDURE — 74018 RADEX ABDOMEN 1 VIEW: CPT | Mod: TC,FY,PO

## 2023-04-06 PROCEDURE — 52310 CYSTOSCOPY AND TREATMENT: CPT | Mod: 58,S$GLB,, | Performed by: UROLOGY

## 2023-04-06 PROCEDURE — 74018 XR ABDOMEN AP 1 VIEW: ICD-10-PCS | Mod: 26,,, | Performed by: RADIOLOGY

## 2023-04-06 NOTE — PROCEDURES
Cystoscopy with left ureteral stent removal    Date/Time: 4/6/2023 11:00 AM  Performed by: LEE Philippe MD  Authorized by: LEE Philippe MD     Consent Done?:  Yes (Written)  Timeout: prior to procedure the correct patient, procedure, and site was verified    Prep: patient was prepped and draped in usual sterile fashion    Anesthesia:  Lidocaine jelly  Indications comment:  Kidney stone  Position:  Supine  Anesthesia:  Lidocaine jelly  Patient sedated?: No    Preparation: Patient was prepped and draped in usual sterile fashion    Scope type:  Flexible cystoscope  Stent removed: Yes     patient tolerated the procedure well with no immediate complications      The genitals were prepped and draped sterily.   Viscous lidocaine jelly was instilled into the urethra.  Using a flexible scope, a careful cystoscopic, exam was then performed.  The mucosa appeared normal.   The left ureteral orifice was visualized and the distal curl of the stent was seen.  The grasping forceps were placed through the working channel of the scope.  The stent was grasped and removed intact.  He tolerated the procedure well.  There were no complications

## 2023-04-12 LAB
COMPN STONE: NORMAL
SPECIMEN SOURCE: NORMAL
STONE ANALYSIS IR-IMP: NORMAL

## 2023-06-23 PROBLEM — Z86.010 HISTORY OF ADENOMATOUS POLYP OF COLON: Status: ACTIVE | Noted: 2023-06-23

## 2023-06-23 PROBLEM — K21.9 GERD WITH STRICTURE: Status: ACTIVE | Noted: 2023-06-23

## 2023-06-23 PROBLEM — Z86.0101 HISTORY OF ADENOMATOUS POLYP OF COLON: Status: ACTIVE | Noted: 2023-06-23

## 2023-06-23 PROBLEM — K22.2 GERD WITH STRICTURE: Status: ACTIVE | Noted: 2023-06-23

## 2023-06-23 PROBLEM — E11.65 TYPE 2 DIABETES MELLITUS WITH HYPERGLYCEMIA, WITHOUT LONG-TERM CURRENT USE OF INSULIN: Status: ACTIVE | Noted: 2023-06-23

## 2023-09-07 ENCOUNTER — PROCEDURE VISIT (OUTPATIENT)
Dept: UROLOGY | Facility: CLINIC | Age: 68
End: 2023-09-07
Payer: MEDICARE

## 2023-09-07 VITALS — HEIGHT: 67 IN | BODY MASS INDEX: 26.23 KG/M2 | WEIGHT: 167.13 LBS

## 2023-09-07 DIAGNOSIS — Z85.51 HX OF BLADDER CANCER: ICD-10-CM

## 2023-09-07 LAB
BILIRUBIN, UA POC OHS: NEGATIVE
BLOOD, UA POC OHS: NEGATIVE
CLARITY, UA POC OHS: CLEAR
COLOR, UA POC OHS: YELLOW
GLUCOSE, UA POC OHS: >=1000
KETONES, UA POC OHS: NEGATIVE
LEUKOCYTES, UA POC OHS: NEGATIVE
NITRITE, UA POC OHS: NEGATIVE
PH, UA POC OHS: 5
PROTEIN, UA POC OHS: NEGATIVE
SPECIFIC GRAVITY, UA POC OHS: <=1.005
UROBILINOGEN, UA POC OHS: 0.2

## 2023-09-07 PROCEDURE — 81003 URINALYSIS AUTO W/O SCOPE: CPT | Mod: QW,S$GLB,, | Performed by: UROLOGY

## 2023-09-07 PROCEDURE — 52000 CYSTOURETHROSCOPY: CPT | Mod: S$GLB,,, | Performed by: UROLOGY

## 2023-09-07 PROCEDURE — 81003 POCT URINALYSIS(INSTRUMENT): ICD-10-PCS | Mod: QW,S$GLB,, | Performed by: UROLOGY

## 2023-09-07 PROCEDURE — 52000 CYSTOSCOPY: ICD-10-PCS | Mod: S$GLB,,, | Performed by: UROLOGY

## 2023-09-07 NOTE — PROCEDURES
Cystoscopy    Date/Time: 9/7/2023 10:00 AM    Performed by: LEE Philippe MD  Authorized by: LEE Philippe MD    Consent Done?:  Yes (Written)  Timeout: prior to procedure the correct patient, procedure, and site was verified    Prep: patient was prepped and draped in usual sterile fashion    Anesthesia:  Lidocaine jelly  Indications: history bladder cancer    Position:  Supine  Anesthesia:  Lidocaine jelly  Patient sedated?: No    Preparation: Patient was prepped and draped in usual sterile fashion    Scope type:  Flexible cystoscope   patient tolerated the procedure well with no immediate complications    Blood Loss:  None    68-year-old with a history of bladder cancer.  He underwent evaluation in 2019 for microscopic hematuria was found to have a small papillary lesion in the posterior wall near the trigone on the right side.  The lesion was cauterized but no biopsy was obtained.  He has no complaints today.  He is scheduled for cystoscopy.    The flexible cystoscope was placed into the urethra and carefully advanced into the bladder.  A careful cystoscopic exam was then performed.  The entire bladder mucosa was systematically visualized.  Findings include moderate bladder wall trabeculation.  There were no lesions, masses foreign bodies or stones.   Each ureteral orifices were visualized and both had clear efflux of urine.  On retroflexion there was a small intravesical gland.  The cystoscope was then removed and I examined the entire length of the urethra.  There was moderate trilobar enlargement of the prostate otherwise the urethra appeared normal.  He tolerated the procedure well.  There were no complications.    DONNIE:  Prostate is 30 g and smooth symmetrical and without nodules.    Impression:  Normal cystoscopy.  Annual follow-up

## 2024-04-11 PROBLEM — I20.9 ANGINA PECTORIS: Status: ACTIVE | Noted: 2024-04-11

## 2024-04-19 PROBLEM — K52.1 DIARRHEA DUE TO DRUG: Status: ACTIVE | Noted: 2024-04-19

## 2024-07-18 PROBLEM — K52.1 DIARRHEA DUE TO DRUG: Status: RESOLVED | Noted: 2024-04-19 | Resolved: 2024-07-18

## 2024-09-05 ENCOUNTER — PROCEDURE VISIT (OUTPATIENT)
Dept: UROLOGY | Facility: CLINIC | Age: 69
End: 2024-09-05
Payer: MEDICARE

## 2024-09-05 VITALS — BODY MASS INDEX: 26.53 KG/M2 | WEIGHT: 169.06 LBS | HEIGHT: 67 IN

## 2024-09-05 DIAGNOSIS — Z85.51 HX OF BLADDER CANCER: ICD-10-CM

## 2024-09-05 NOTE — PROCEDURES
Cystoscopy    Date/Time: 9/5/2024 10:00 AM    Performed by: LEE Philippe MD  Authorized by: LEE Philippe MD    Consent Done?:  Yes (Written)  Timeout: prior to procedure the correct patient, procedure, and site was verified    Prep: patient was prepped and draped in usual sterile fashion    Anesthesia:  Lidocaine jelly  Indications: history bladder cancer    Position:  Supine  Anesthesia:  Lidocaine jelly  Patient sedated?: No    Preparation: Patient was prepped and draped in usual sterile fashion    Scope type:  Flexible cystoscope   patient tolerated the procedure well with no immediate complications    Blood Loss:  None     69-year-old with a history of bladder cancer.  He underwent evaluation in 2019 for microscopic hematuria was found to have a small papillary lesion in the posterior wall near the trigone on the right side.  The lesion was cauterized but no biopsy was obtained.  He has no complaints today.  He is scheduled for cystoscopy.     The flexible cystoscope was placed into the urethra and carefully advanced into the bladder.  A careful cystoscopic exam was then performed.  The entire bladder mucosa was systematically visualized.  Findings include moderate bladder wall trabeculation.  There were no lesions, masses foreign bodies or stones.   Each ureteral orifices were visualized and both had clear efflux of urine.  On retroflexion there was a small intravesical gland.  The cystoscope was then removed and I examined the entire length of the urethra.  There was moderate trilobar enlargement of the prostate otherwise the urethra appeared normal.  He tolerated the procedure well.  There were no complications.     DONNIE:  Prostate is 30 g and smooth symmetrical and without nodules.     Component      Latest Ref Rng 1/23/2024   Prostate Specific Antigen      0.00 - 4.00 ng/mL 0.09      Impression:  Normal cystoscopy.  Follow-up 2 years for cystoscopy

## 2025-04-02 PROBLEM — E78.5 HYPERLIPIDEMIA: Status: ACTIVE | Noted: 2025-04-02

## 2025-04-02 PROBLEM — I20.9 ANGINA PECTORIS: Status: RESOLVED | Noted: 2024-04-11 | Resolved: 2025-04-02

## 2025-04-04 ENCOUNTER — TELEPHONE (OUTPATIENT)
Dept: PODIATRY | Facility: CLINIC | Age: 70
End: 2025-04-04
Payer: MEDICARE

## 2025-04-04 NOTE — TELEPHONE ENCOUNTER
----- Message from Pham sent at 4/4/2025 12:27 PM CDT -----  Regarding: Same Day Appointment Request  Contact: Patient wife at [phone]  Type:  Same Appointment RequestCaller is requesting a sooner appointment.  Name of Caller:  Patient wife at [phone] is the first available appointment?  June Symptoms:  possible infection in toe/foot; patient has diabetes Additional Information:  Patient is in pain in foot, possible infection. Patient does have a stress test scheduled for 4/7. Patient prefers to come to [place] and not [place] or [facility]. Please call and advise as soon as possible. Thank you.  ----- Message -----  From: Pham Gusman  Sent: 4/4/2025  12:30 PM CDT  To: Neeta Baez Staff  Subject: Same Day Appointment Request                     Type:  Same Appointment RequestCaller is requesting a sooner appointment.  Name of Caller:  Patient wife at [phone] is the first available appointment?  June Symptoms:  possible infection in toe/foot; patient has diabetes Additional Information:  Patient is in pain in foot, possible infection. Patient does have a stress test scheduled for 4/7. Please call and advise as soon as possible. Thank you.

## 2025-04-08 ENCOUNTER — OFFICE VISIT (OUTPATIENT)
Dept: PODIATRY | Facility: CLINIC | Age: 70
End: 2025-04-08
Payer: MEDICARE

## 2025-04-08 VITALS — BODY MASS INDEX: 26.68 KG/M2 | HEIGHT: 67 IN | WEIGHT: 170 LBS

## 2025-04-08 DIAGNOSIS — L60.0 INGROWN NAIL OF GREAT TOE OF LEFT FOOT: Primary | ICD-10-CM

## 2025-04-08 PROCEDURE — 99203 OFFICE O/P NEW LOW 30 MIN: CPT | Mod: S$GLB,,, | Performed by: PODIATRIST

## 2025-04-08 PROCEDURE — 99999 PR PBB SHADOW E&M-EST. PATIENT-LVL III: CPT | Mod: PBBFAC,,, | Performed by: PODIATRIST

## 2025-04-08 PROCEDURE — 4010F ACE/ARB THERAPY RXD/TAKEN: CPT | Mod: CPTII,S$GLB,, | Performed by: PODIATRIST

## 2025-04-08 PROCEDURE — 3008F BODY MASS INDEX DOCD: CPT | Mod: CPTII,S$GLB,, | Performed by: PODIATRIST

## 2025-04-08 PROCEDURE — 3061F NEG MICROALBUMINURIA REV: CPT | Mod: CPTII,S$GLB,, | Performed by: PODIATRIST

## 2025-04-08 PROCEDURE — 3288F FALL RISK ASSESSMENT DOCD: CPT | Mod: CPTII,S$GLB,, | Performed by: PODIATRIST

## 2025-04-08 PROCEDURE — 3066F NEPHROPATHY DOC TX: CPT | Mod: CPTII,S$GLB,, | Performed by: PODIATRIST

## 2025-04-08 PROCEDURE — 1160F RVW MEDS BY RX/DR IN RCRD: CPT | Mod: CPTII,S$GLB,, | Performed by: PODIATRIST

## 2025-04-08 PROCEDURE — 1101F PT FALLS ASSESS-DOCD LE1/YR: CPT | Mod: CPTII,S$GLB,, | Performed by: PODIATRIST

## 2025-04-08 PROCEDURE — 3051F HG A1C>EQUAL 7.0%<8.0%: CPT | Mod: CPTII,S$GLB,, | Performed by: PODIATRIST

## 2025-04-08 PROCEDURE — 1159F MED LIST DOCD IN RCRD: CPT | Mod: CPTII,S$GLB,, | Performed by: PODIATRIST

## 2025-04-08 NOTE — PROGRESS NOTES
Subjective:      Patient ID: Hima Mensah is a 69 y.o. male.    Chief Complaint: toe/ foot pain    Hima is a 69 y.o. male who presents to the clinic complaining of painful ingrown toenail on the left foot both borders. Had an ingrown nail issue with the right big toe a couple months ago and had a procedure done somewhere else, he said it got infected and has been on augmentin and more recently bactrim, it has started to calm down. Now the left toe is bothering him    Review of Systems   Constitutional: Negative for chills and fever.   Cardiovascular:  Negative for claudication and leg swelling.   Respiratory:  Negative for shortness of breath.    Skin:  Positive for nail changes. Negative for itching and rash.   Musculoskeletal:  Negative for muscle cramps, muscle weakness and myalgias.   Gastrointestinal:  Negative for nausea and vomiting.   Neurological:  Negative for focal weakness, loss of balance, numbness and paresthesias.           Objective:      Physical Exam  Constitutional:       General: He is not in acute distress.     Appearance: He is well-developed. He is not diaphoretic.   Cardiovascular:      Pulses:           Dorsalis pedis pulses are 2+ on the right side and 2+ on the left side.        Posterior tibial pulses are 2+ on the right side and 2+ on the left side.      Comments: < 3 sec capillary refill time to toes 1-5 bilateral. Toes and feet are warm to touch proximally with normal distal cooling b/l. There is some hair growth on the feet and toes b/l. There is no edema b/l. No spider veins or varicosities present b/l.     Musculoskeletal:      Comments: Equinus noted b/l ankles with < 10 deg DF noted. MMT 5/5 in DF/PF/Inv/Ev resistance with no reproduction of pain in any direction. Passive range of motion of ankle and pedal joints is painless b/l.     Skin:     General: Skin is warm and dry.      Coloration: Skin is not pale.      Findings: No abrasion, bruising, burn, ecchymosis, erythema,  laceration, lesion, petechiae or rash.      Nails: There is no clubbing.      Comments: Skin temperature, texture and turgor within normal limits.    Medial and lateral hallux nail margins of the left foot with ingrown nail plate. Surrounding erythema and minimal edema is noted there is no granuloma formation noted. No drainage or malodor     Right hallux medial border is removed and well healed with minimal erythema       Neurological:      Mental Status: He is alert and oriented to person, place, and time.      Sensory: No sensory deficit.      Motor: No tremor, atrophy or abnormal muscle tone.      Comments: Negative tinel sign bilateral.   Psychiatric:         Behavior: Behavior normal.               Assessment:       Encounter Diagnosis   Name Primary?    Ingrown nail of great toe of left foot Yes         Plan:       Hima was seen today for toe/ foot pain.    Diagnoses and all orders for this visit:    Ingrown nail of great toe of left foot      I counseled the patient on his conditions, their implications and medical management.    Utilizing sterile toenail clippers I aggressively debrided the offending nail border approximately 3 mm from its edge and carried the nail plate incision down at an angle in order to wedge out the offending cryptotic portion of the nail plate. The offending border was then removed in toto.  No blood was drawn. Patient tolerated the procedure well and related significant relief.    Discussed doing a more permanent procedure like with the other toe he had done, he will return for this procedure left great toe both borders.    Return for procedure as planned    Nestor Santacruz DPM

## 2025-07-28 ENCOUNTER — TELEPHONE (OUTPATIENT)
Dept: GASTROENTEROLOGY | Facility: CLINIC | Age: 70
End: 2025-07-28
Payer: MEDICARE

## 2025-08-12 ENCOUNTER — TELEPHONE (OUTPATIENT)
Dept: GASTROENTEROLOGY | Facility: CLINIC | Age: 70
End: 2025-08-12
Payer: MEDICARE

## 2025-08-20 ENCOUNTER — OFFICE VISIT (OUTPATIENT)
Dept: PODIATRY | Facility: CLINIC | Age: 70
End: 2025-08-20
Payer: MEDICARE

## 2025-08-20 DIAGNOSIS — L60.0 INGROWN NAIL OF GREAT TOE OF LEFT FOOT: Primary | ICD-10-CM

## 2025-08-20 PROCEDURE — 3044F HG A1C LEVEL LT 7.0%: CPT | Mod: CPTII,S$GLB,, | Performed by: PODIATRIST

## 2025-08-20 PROCEDURE — 4010F ACE/ARB THERAPY RXD/TAKEN: CPT | Mod: CPTII,S$GLB,, | Performed by: PODIATRIST

## 2025-08-20 PROCEDURE — 99999 PR PBB SHADOW E&M-EST. PATIENT-LVL I: CPT | Mod: PBBFAC,,, | Performed by: PODIATRIST

## 2025-08-20 PROCEDURE — 1160F RVW MEDS BY RX/DR IN RCRD: CPT | Mod: CPTII,S$GLB,, | Performed by: PODIATRIST

## 2025-08-20 PROCEDURE — 3066F NEPHROPATHY DOC TX: CPT | Mod: CPTII,S$GLB,, | Performed by: PODIATRIST

## 2025-08-20 PROCEDURE — 1159F MED LIST DOCD IN RCRD: CPT | Mod: CPTII,S$GLB,, | Performed by: PODIATRIST

## 2025-08-20 PROCEDURE — 99213 OFFICE O/P EST LOW 20 MIN: CPT | Mod: S$GLB,,, | Performed by: PODIATRIST

## 2025-08-20 PROCEDURE — 3061F NEG MICROALBUMINURIA REV: CPT | Mod: CPTII,S$GLB,, | Performed by: PODIATRIST
